# Patient Record
Sex: MALE | Race: BLACK OR AFRICAN AMERICAN | NOT HISPANIC OR LATINO | Employment: FULL TIME | URBAN - METROPOLITAN AREA
[De-identification: names, ages, dates, MRNs, and addresses within clinical notes are randomized per-mention and may not be internally consistent; named-entity substitution may affect disease eponyms.]

---

## 2017-12-22 ENCOUNTER — CONVERSION ENCOUNTER (OUTPATIENT)
Dept: FAMILY MEDICINE CLINIC | Facility: CLINIC | Age: 57
End: 2017-12-22

## 2017-12-22 ENCOUNTER — HOSPITAL ENCOUNTER (OUTPATIENT)
Dept: FAMILY MEDICINE CLINIC | Facility: CLINIC | Age: 57
Setting detail: SPECIMEN
Discharge: HOME | End: 2017-12-22
Attending: NURSE PRACTITIONER | Admitting: NURSE PRACTITIONER

## 2017-12-22 LAB
ALBUMIN SERPL-MCNC: 4.4 G/DL (ref 3.5–4.8)
ALBUMIN/GLOB SERPL: 1.2 {RATIO} (ref 1–1.7)
ALP SERPL-CCNC: 93 IU/L (ref 32–91)
ALT SERPL-CCNC: 16 IU/L (ref 17–63)
ANION GAP SERPL CALC-SCNC: 16.5 MMOL/L (ref 10–20)
AST SERPL-CCNC: 20 IU/L (ref 15–41)
BILIRUB SERPL-MCNC: 0.3 MG/DL (ref 0.3–1.2)
BILIRUB UR QL STRIP: NEGATIVE MG/DL
BUN SERPL-MCNC: 6 MG/DL (ref 8–20)
BUN/CREAT SERPL: 7.5 (ref 6.2–20.3)
CALCIUM SERPL-MCNC: 10.2 MG/DL (ref 8.9–10.3)
CASTS URNS QL MICRO: NORMAL /[LPF]
CHLORIDE SERPL-SCNC: 103 MMOL/L (ref 101–111)
CHOLEST SERPL-MCNC: 268 MG/DL
CHOLEST/HDLC SERPL: 3 {RATIO}
COLOR UR: YELLOW
CONV BACTERIA IN URINE MICRO: NEGATIVE
CONV CLARITY OF URINE: CLEAR
CONV CO2: 28 MMOL/L (ref 22–32)
CONV DACROCYTES (PRESENCE) IN BLOOD BY LIGHT MICROSCOPY: (no result)
CONV HYALINE CASTS IN URINE MICRO: 2 /[LPF] (ref 0–5)
CONV LDL CHOLESTEROL DIRECT: 150 MG/DL (ref 0–100)
CONV PLATELETS GIANT IN BLOOD BY LIGHT MICROSCOPY: (no result)
CONV POIKILOCYTOSIS IN BLOOD BY LIGHT MICROSCOPY: SLIGHT
CONV PROTEIN IN URINE BY AUTOMATED TEST STRIP: NEGATIVE MG/DL
CONV SMALL ROUND CELLS: NORMAL /[HPF]
CONV TOTAL PROTEIN: 8 G/DL (ref 6.1–7.9)
CONV UROBILINOGEN IN URINE BY AUTOMATED TEST STRIP: 0.2 MG/DL
CREAT UR-MCNC: 0.8 MG/DL (ref 0.7–1.2)
CULTURE INDICATED?: NORMAL
DIFFERENTIAL METHOD BLD: (no result)
EOSINOPHIL # BLD AUTO: 0.1 10*3/UL (ref 0–0.3)
EOSINOPHIL # BLD AUTO: 1 % (ref 0–3)
ERYTHROCYTE [DISTWIDTH] IN BLOOD BY AUTOMATED COUNT: 12.8 % (ref 11.5–14.5)
GLOBULIN UR ELPH-MCNC: 3.6 G/DL (ref 2.5–3.8)
GLUCOSE SERPL-MCNC: 96 MG/DL (ref 65–99)
GLUCOSE UR QL: NEGATIVE MG/DL
HCT VFR BLD AUTO: 47.3 % (ref 40–54)
HDLC SERPL-MCNC: 91 MG/DL
HGB BLD-MCNC: 15.7 G/DL (ref 14–18)
HGB UR QL STRIP: NEGATIVE
KETONES UR QL STRIP: NEGATIVE MG/DL
LDLC/HDLC SERPL: 1.7 {RATIO}
LEUKOCYTE ESTERASE UR QL STRIP: NEGATIVE
LIPID INTERPRETATION: ABNORMAL
LYMPHOCYTES # BLD AUTO: 2.3 10*3/UL (ref 0.8–4.8)
LYMPHOCYTES NFR BLD AUTO: 46 % (ref 18–42)
MCH RBC QN AUTO: 30.6 PG (ref 26–32)
MCHC RBC AUTO-ENTMCNC: 33.2 G/DL (ref 32–36)
MCV RBC AUTO: 92.1 FL (ref 80–94)
MONOCYTES # BLD AUTO: 0.2 10*3/UL (ref 0.1–1.3)
MONOCYTES NFR BLD AUTO: 5 % (ref 2–11)
NEUTROPHILS # BLD AUTO: 2.3 10*3/UL (ref 2.3–8.6)
NEUTROPHILS NFR BLD AUTO: 48 % (ref 50–75)
NITRITE UR QL STRIP: NEGATIVE
PH UR STRIP.AUTO: 6 [PH] (ref 4.5–8)
PLATELET # BLD AUTO: 154 10*3/UL (ref 150–450)
PMV BLD AUTO: 10.2 FL (ref 7.4–10.4)
POTASSIUM SERPL-SCNC: 4.5 MMOL/L (ref 3.6–5.1)
RBC # BLD AUTO: 5.14 10*6/UL (ref 4.6–6)
RBC #/AREA URNS HPF: 0 /[HPF] (ref 0–3)
SODIUM SERPL-SCNC: 143 MMOL/L (ref 136–144)
SP GR UR: 1.02 (ref 1–1.03)
SPERM URNS QL MICRO: NORMAL /[HPF]
SQUAMOUS SPT QL MICRO: 0 /[HPF] (ref 0–5)
TRIGL SERPL-MCNC: 164 MG/DL
UNIDENT CRYS URNS QL MICRO: NORMAL /[HPF]
VLDLC SERPL CALC-MCNC: 27.3 MG/DL
WBC # BLD AUTO: 4.9 10*3/UL (ref 4.5–11.5)
WBC #/AREA URNS HPF: 0 /[HPF] (ref 0–5)
YEAST SPEC QL WET PREP: NORMAL /[HPF]

## 2018-01-26 ENCOUNTER — CONVERSION ENCOUNTER (OUTPATIENT)
Dept: FAMILY MEDICINE CLINIC | Facility: CLINIC | Age: 58
End: 2018-01-26

## 2018-02-16 ENCOUNTER — CONVERSION ENCOUNTER (OUTPATIENT)
Dept: FAMILY MEDICINE CLINIC | Facility: CLINIC | Age: 58
End: 2018-02-16

## 2018-08-17 ENCOUNTER — CONVERSION ENCOUNTER (OUTPATIENT)
Dept: FAMILY MEDICINE CLINIC | Facility: CLINIC | Age: 58
End: 2018-08-17

## 2019-02-15 ENCOUNTER — CONVERSION ENCOUNTER (OUTPATIENT)
Dept: FAMILY MEDICINE CLINIC | Facility: CLINIC | Age: 59
End: 2019-02-15

## 2019-02-15 ENCOUNTER — HOSPITAL ENCOUNTER (OUTPATIENT)
Dept: FAMILY MEDICINE CLINIC | Facility: CLINIC | Age: 59
Setting detail: SPECIMEN
Discharge: HOME | End: 2019-02-15
Attending: NURSE PRACTITIONER | Admitting: NURSE PRACTITIONER

## 2019-02-15 LAB
ALBUMIN SERPL-MCNC: 4.7 G/DL (ref 3.5–4.8)
ALBUMIN/GLOB SERPL: 1.3 {RATIO} (ref 1–1.7)
ALP SERPL-CCNC: 134 IU/L (ref 32–91)
ALT SERPL-CCNC: 18 IU/L (ref 17–63)
ANION GAP SERPL CALC-SCNC: 12.6 MMOL/L (ref 10–20)
AST SERPL-CCNC: 21 IU/L (ref 15–41)
BILIRUB SERPL-MCNC: 0.8 MG/DL (ref 0.3–1.2)
BUN SERPL-MCNC: 11 MG/DL (ref 8–20)
BUN/CREAT SERPL: 18.3 (ref 6.2–20.3)
CALCIUM SERPL-MCNC: 9.7 MG/DL (ref 8.9–10.3)
CHLORIDE SERPL-SCNC: 105 MMOL/L (ref 101–111)
CHOLEST SERPL-MCNC: 262 MG/DL
CHOLEST/HDLC SERPL: 2.9 {RATIO}
CONV CO2: 24 MMOL/L (ref 22–32)
CONV LDL CHOLESTEROL DIRECT: 157 MG/DL (ref 0–100)
CONV TOTAL PROTEIN: 8.3 G/DL (ref 6.1–7.9)
CREAT UR-MCNC: 0.6 MG/DL (ref 0.7–1.2)
GLOBULIN UR ELPH-MCNC: 3.6 G/DL (ref 2.5–3.8)
GLUCOSE SERPL-MCNC: 117 MG/DL (ref 65–99)
HDLC SERPL-MCNC: 91 MG/DL
LDLC/HDLC SERPL: 1.7 {RATIO}
LIPID INTERPRETATION: ABNORMAL
MAGNESIUM SERPL-MCNC: 2 MG/DL (ref 1.8–2.5)
POTASSIUM SERPL-SCNC: 3.6 MMOL/L (ref 3.6–5.1)
SODIUM SERPL-SCNC: 138 MMOL/L (ref 136–144)
TRIGL SERPL-MCNC: 52 MG/DL
VLDLC SERPL CALC-MCNC: 14 MG/DL

## 2019-06-04 VITALS
HEIGHT: 74 IN | SYSTOLIC BLOOD PRESSURE: 136 MMHG | OXYGEN SATURATION: 98 % | HEIGHT: 74 IN | RESPIRATION RATE: 20 BRPM | RESPIRATION RATE: 20 BRPM | HEIGHT: 74 IN | WEIGHT: 179 LBS | SYSTOLIC BLOOD PRESSURE: 169 MMHG | OXYGEN SATURATION: 98 % | BODY MASS INDEX: 22.41 KG/M2 | BODY MASS INDEX: 22.7 KG/M2 | RESPIRATION RATE: 16 BRPM | SYSTOLIC BLOOD PRESSURE: 158 MMHG | WEIGHT: 176.9 LBS | WEIGHT: 176.7 LBS | DIASTOLIC BLOOD PRESSURE: 68 MMHG | OXYGEN SATURATION: 99 % | OXYGEN SATURATION: 98 % | HEART RATE: 66 BPM | WEIGHT: 179.4 LBS | BODY MASS INDEX: 22.97 KG/M2 | SYSTOLIC BLOOD PRESSURE: 124 MMHG | BODY MASS INDEX: 23.02 KG/M2 | BODY MASS INDEX: 22.68 KG/M2 | WEIGHT: 174.6 LBS | DIASTOLIC BLOOD PRESSURE: 107 MMHG | HEART RATE: 67 BPM | DIASTOLIC BLOOD PRESSURE: 79 MMHG | RESPIRATION RATE: 16 BRPM | HEART RATE: 85 BPM | DIASTOLIC BLOOD PRESSURE: 76 MMHG | HEART RATE: 74 BPM | DIASTOLIC BLOOD PRESSURE: 91 MMHG | SYSTOLIC BLOOD PRESSURE: 126 MMHG | HEIGHT: 74 IN | OXYGEN SATURATION: 96 % | HEART RATE: 63 BPM | HEIGHT: 74 IN | RESPIRATION RATE: 24 BRPM

## 2019-08-16 ENCOUNTER — OFFICE VISIT (OUTPATIENT)
Dept: FAMILY MEDICINE CLINIC | Facility: CLINIC | Age: 59
End: 2019-08-16

## 2019-08-16 VITALS
OXYGEN SATURATION: 98 % | TEMPERATURE: 98.3 F | BODY MASS INDEX: 22.37 KG/M2 | DIASTOLIC BLOOD PRESSURE: 81 MMHG | HEIGHT: 74 IN | WEIGHT: 174.3 LBS | SYSTOLIC BLOOD PRESSURE: 137 MMHG | RESPIRATION RATE: 16 BRPM | HEART RATE: 55 BPM

## 2019-08-16 DIAGNOSIS — E78.5 HYPERLIPIDEMIA, UNSPECIFIED HYPERLIPIDEMIA TYPE: Primary | ICD-10-CM

## 2019-08-16 DIAGNOSIS — F10.10 ALCOHOL ABUSE: ICD-10-CM

## 2019-08-16 DIAGNOSIS — I10 HYPERTENSION, UNSPECIFIED TYPE: ICD-10-CM

## 2019-08-16 PROBLEM — E88.09 HYPERPROTEINEMIA: Status: ACTIVE | Noted: 2019-02-15

## 2019-08-16 PROBLEM — Z81.1 FAMILY HISTORY OF ALCOHOLISM: Status: ACTIVE | Noted: 2018-02-16

## 2019-08-16 PROCEDURE — 99213 OFFICE O/P EST LOW 20 MIN: CPT | Performed by: NURSE PRACTITIONER

## 2019-08-16 RX ORDER — AMLODIPINE BESYLATE 10 MG/1
TABLET ORAL DAILY
Refills: 0 | COMMUNITY
Start: 2019-06-13 | End: 2019-09-18 | Stop reason: SDUPTHER

## 2019-08-16 NOTE — PROGRESS NOTES
"Subjective   Enrrique Cason is a 58 y.o. male.     Chief Complaint   Patient presents with   • Hypertension     6 month follow up    • Hyperlipidemia   • Alcohol Intoxication   • Hyperproteinemia       HPI  Patient is here for follow up on his chronic medical problems and medication refills.  Hypertension: he is taking amlodipine 10 mg.he never checks his blood pressure.  Alcohol abuse ; still 6 beers day and 1/2 of 1/2 pint. Going to be honest he reports.  Hyperlipidemia: he declines medications says he likes meat and ain't going to stop him from eating this.      The following portions of the patient's history were reviewed and updated as appropriate: allergies, current medications, past family history, past medical history, past social history, past surgical history and problem list.      Current Outpatient Medications:   •  amLODIPine (NORVASC) 10 MG tablet, Take  by mouth Daily., Disp: , Rfl: 0    No results found for this or any previous visit (from the past 4032 hour(s)).      Review of Systems   Constitutional: Negative for chills and fever.   HENT: Negative for congestion, sinus pressure and swollen glands.    Eyes: Negative for blurred vision and pain.   Respiratory: Negative for cough and shortness of breath.    Cardiovascular: Negative for chest pain and leg swelling.   Gastrointestinal: Negative for abdominal pain, nausea and indigestion.   Endocrine: Negative for cold intolerance, heat intolerance, polydipsia, polyphagia and polyuria.   Skin: Negative for dry skin, rash and bruise.   Neurological: Negative for seizures, light-headedness, headache and memory problem.   Psychiatric/Behavioral: Negative for dysphoric mood and stress.       Objective     /81 (BP Location: Left arm, Patient Position: Sitting, Cuff Size: Adult)   Pulse 55   Temp 98.3 °F (36.8 °C) (Oral)   Resp 16   Ht 188 cm (74\")   Wt 79.1 kg (174 lb 4.8 oz)   SpO2 98%   BMI 22.38 kg/m²     Physical Exam   Constitutional: He is " oriented to person, place, and time. He appears well-developed and well-nourished.   HENT:   Head: Normocephalic and atraumatic.   Eyes: Conjunctivae and EOM are normal. Pupils are equal, round, and reactive to light.   Neck: Normal range of motion. Neck supple.   Cardiovascular: Normal rate, regular rhythm, normal heart sounds and intact distal pulses.   Pulmonary/Chest: Effort normal and breath sounds normal.   Abdominal: Soft. Bowel sounds are normal.   Musculoskeletal: Normal range of motion.   Neurological: He is alert and oriented to person, place, and time.   Skin: Skin is warm and dry.   Psychiatric: He has a normal mood and affect. His behavior is normal.   Nursing note and vitals reviewed.        Assessment/Plan   Enrrique was seen today for hypertension, hyperlipidemia, alcohol intoxication and hyperproteinemia.    Diagnoses and all orders for this visit:    Hyperlipidemia, unspecified hyperlipidemia type    Hypertension, unspecified type    Alcohol abuse      Patient Instructions   You should stop drinking.  Try  To eat less meat and more vegetables.  Keep taking medication check blood pressure occassionaly.         Joanna Hair, JANINA    08/16/19

## 2019-08-16 NOTE — PATIENT INSTRUCTIONS
You should stop drinking.  Try  To eat less meat and more vegetables.  Keep taking medication check blood pressure occassionaly.

## 2019-09-18 RX ORDER — AMLODIPINE BESYLATE 10 MG/1
TABLET ORAL
Qty: 90 TABLET | Refills: 0 | Status: SHIPPED | OUTPATIENT
Start: 2019-09-18 | End: 2019-12-22

## 2019-12-22 RX ORDER — AMLODIPINE BESYLATE 10 MG/1
TABLET ORAL
Qty: 90 TABLET | Refills: 0 | Status: SHIPPED | OUTPATIENT
Start: 2019-12-22 | End: 2020-02-21 | Stop reason: SDUPTHER

## 2020-02-21 ENCOUNTER — APPOINTMENT (OUTPATIENT)
Dept: LAB | Facility: HOSPITAL | Age: 60
End: 2020-02-21

## 2020-02-21 ENCOUNTER — OFFICE VISIT (OUTPATIENT)
Dept: FAMILY MEDICINE CLINIC | Facility: CLINIC | Age: 60
End: 2020-02-21

## 2020-02-21 VITALS
HEIGHT: 74 IN | HEART RATE: 66 BPM | TEMPERATURE: 98.2 F | WEIGHT: 181.7 LBS | OXYGEN SATURATION: 98 % | BODY MASS INDEX: 23.32 KG/M2 | DIASTOLIC BLOOD PRESSURE: 94 MMHG | SYSTOLIC BLOOD PRESSURE: 165 MMHG | RESPIRATION RATE: 18 BRPM

## 2020-02-21 DIAGNOSIS — E88.09 HYPERPROTEINEMIA: ICD-10-CM

## 2020-02-21 DIAGNOSIS — E78.5 HYPERLIPIDEMIA, UNSPECIFIED HYPERLIPIDEMIA TYPE: Primary | ICD-10-CM

## 2020-02-21 DIAGNOSIS — F10.10 ALCOHOL ABUSE: ICD-10-CM

## 2020-02-21 DIAGNOSIS — I10 HYPERTENSION, UNSPECIFIED TYPE: ICD-10-CM

## 2020-02-21 LAB
ALBUMIN SERPL-MCNC: 5 G/DL (ref 3.5–5.2)
ALBUMIN/GLOB SERPL: 1.5 G/DL
ALP SERPL-CCNC: 121 U/L (ref 39–117)
ALT SERPL W P-5'-P-CCNC: 14 U/L (ref 1–41)
ANION GAP SERPL CALCULATED.3IONS-SCNC: 14.4 MMOL/L (ref 5–15)
AST SERPL-CCNC: 17 U/L (ref 1–40)
BASOPHILS # BLD AUTO: 0.02 10*3/MM3 (ref 0–0.2)
BASOPHILS NFR BLD AUTO: 0.4 % (ref 0–1.5)
BILIRUB SERPL-MCNC: 0.5 MG/DL (ref 0.2–1.2)
BUN BLD-MCNC: 13 MG/DL (ref 6–20)
BUN/CREAT SERPL: 16.3 (ref 7–25)
CALCIUM SPEC-SCNC: 10.2 MG/DL (ref 8.6–10.5)
CHLORIDE SERPL-SCNC: 100 MMOL/L (ref 98–107)
CHOLEST SERPL-MCNC: 249 MG/DL (ref 0–200)
CO2 SERPL-SCNC: 25.6 MMOL/L (ref 22–29)
CREAT BLD-MCNC: 0.8 MG/DL (ref 0.76–1.27)
DEPRECATED RDW RBC AUTO: 38.5 FL (ref 37–54)
EOSINOPHIL # BLD AUTO: 0.05 10*3/MM3 (ref 0–0.4)
EOSINOPHIL NFR BLD AUTO: 1 % (ref 0.3–6.2)
ERYTHROCYTE [DISTWIDTH] IN BLOOD BY AUTOMATED COUNT: 12 % (ref 12.3–15.4)
GFR SERPL CREATININE-BSD FRML MDRD: 120 ML/MIN/1.73
GLOBULIN UR ELPH-MCNC: 3.3 GM/DL
GLUCOSE BLD-MCNC: 145 MG/DL (ref 65–99)
HCT VFR BLD AUTO: 46.6 % (ref 37.5–51)
HDLC SERPL-MCNC: 104 MG/DL (ref 40–60)
HGB BLD-MCNC: 16.1 G/DL (ref 13–17.7)
IMM GRANULOCYTES # BLD AUTO: 0.01 10*3/MM3 (ref 0–0.05)
IMM GRANULOCYTES NFR BLD AUTO: 0.2 % (ref 0–0.5)
LDLC SERPL CALC-MCNC: 133 MG/DL (ref 0–100)
LDLC/HDLC SERPL: 1.28 {RATIO}
LYMPHOCYTES # BLD AUTO: 2.23 10*3/MM3 (ref 0.7–3.1)
LYMPHOCYTES NFR BLD AUTO: 46.5 % (ref 19.6–45.3)
MCH RBC QN AUTO: 30.3 PG (ref 26.6–33)
MCHC RBC AUTO-ENTMCNC: 34.5 G/DL (ref 31.5–35.7)
MCV RBC AUTO: 87.6 FL (ref 79–97)
MONOCYTES # BLD AUTO: 0.43 10*3/MM3 (ref 0.1–0.9)
MONOCYTES NFR BLD AUTO: 9 % (ref 5–12)
NEUTROPHILS # BLD AUTO: 2.06 10*3/MM3 (ref 1.7–7)
NEUTROPHILS NFR BLD AUTO: 42.9 % (ref 42.7–76)
NRBC BLD AUTO-RTO: 0 /100 WBC (ref 0–0.2)
PLATELET # BLD AUTO: 182 10*3/MM3 (ref 140–450)
PMV BLD AUTO: 12.1 FL (ref 6–12)
POTASSIUM BLD-SCNC: 4.4 MMOL/L (ref 3.5–5.2)
PROT SERPL-MCNC: 8.3 G/DL (ref 6–8.5)
RBC # BLD AUTO: 5.32 10*6/MM3 (ref 4.14–5.8)
SODIUM BLD-SCNC: 140 MMOL/L (ref 136–145)
TRIGL SERPL-MCNC: 59 MG/DL (ref 0–150)
VLDLC SERPL-MCNC: 11.8 MG/DL (ref 5–40)
WBC NRBC COR # BLD: 4.8 10*3/MM3 (ref 3.4–10.8)

## 2020-02-21 PROCEDURE — 80053 COMPREHEN METABOLIC PANEL: CPT | Performed by: NURSE PRACTITIONER

## 2020-02-21 PROCEDURE — 36415 COLL VENOUS BLD VENIPUNCTURE: CPT | Performed by: NURSE PRACTITIONER

## 2020-02-21 PROCEDURE — 80061 LIPID PANEL: CPT | Performed by: NURSE PRACTITIONER

## 2020-02-21 PROCEDURE — 85025 COMPLETE CBC W/AUTO DIFF WBC: CPT | Performed by: NURSE PRACTITIONER

## 2020-02-21 PROCEDURE — 84165 PROTEIN E-PHORESIS SERUM: CPT | Performed by: NURSE PRACTITIONER

## 2020-02-21 PROCEDURE — 99213 OFFICE O/P EST LOW 20 MIN: CPT | Performed by: NURSE PRACTITIONER

## 2020-02-21 RX ORDER — AMLODIPINE BESYLATE 10 MG/1
10 TABLET ORAL DAILY
Qty: 90 TABLET | Refills: 1 | Status: SHIPPED | OUTPATIENT
Start: 2020-02-21 | End: 2020-02-21 | Stop reason: SDUPTHER

## 2020-02-21 RX ORDER — LOSARTAN POTASSIUM 25 MG/1
25 TABLET ORAL DAILY
Qty: 90 TABLET | Refills: 0 | Status: SHIPPED | OUTPATIENT
Start: 2020-02-21 | End: 2020-05-18

## 2020-02-21 RX ORDER — AMLODIPINE BESYLATE 10 MG/1
10 TABLET ORAL DAILY
Qty: 90 TABLET | Refills: 1 | Status: SHIPPED | OUTPATIENT
Start: 2020-02-21 | End: 2020-08-13

## 2020-02-21 NOTE — PATIENT INSTRUCTIONS
Continue amlodipine start the losartan daily.   AA meeting may help with alchohol abuse.   Follow up one month recheck blood pressure  Follow up on labs.

## 2020-02-21 NOTE — PROGRESS NOTES
"Subjective   Enrrique Cason is a 59 y.o. male.     Chief Complaint   Patient presents with   • Hyperlipidemia     Alcohol abuse , arthritis - 6 month follow up    • Hypertension       HPI  He is here for management of his chronic medical problems. Hypertension and alcohol abuse  hyperproteinemia    Hypertension: elevated in office. 165/94. Taking amlodipine 10 mg once day. He does drink daily.  Drinks beer and shot of vodka. He has had stress of recent.     Alcohol abuse: he works daily he has hypertension. He drinks beer and shot of vodka.     Hyperproteinemia: has been consistenly high will check levels  Serum electrophoresis. He denies night time fevers, no unexpained weight loss.     The following portions of the patient's history were reviewed and updated as appropriate: allergies, current medications, past family history, past medical history, past social history, past surgical history and problem list.      Current Outpatient Medications:   •  amLODIPine (NORVASC) 10 MG tablet, Take 1 tablet by mouth Daily., Disp: 90 tablet, Rfl: 1  •  losartan (COZAAR) 25 MG tablet, Take 1 tablet by mouth Daily., Disp: 90 tablet, Rfl: 0    No results found for this or any previous visit (from the past 4032 hour(s)).      Review of Systems   Cardiovascular: Negative for chest pain and leg swelling.   Genitourinary: Negative for dysuria and frequency.   Musculoskeletal: Negative for back pain.   Allergic/Immunologic: Negative.    Neurological: Negative for dizziness.   Psychiatric/Behavioral: Positive for stress.       Objective     /94 (BP Location: Left arm, Patient Position: Sitting, Cuff Size: Adult)   Pulse 66   Temp 98.2 °F (36.8 °C) (Oral)   Resp 18   Ht 188 cm (74\")   Wt 82.4 kg (181 lb 11.2 oz)   SpO2 98%   BMI 23.33 kg/m²     Physical Exam   Constitutional: He is oriented to person, place, and time. He appears well-developed and well-nourished.   HENT:   Head: Normocephalic and atraumatic.   Right Ear: " External ear normal.   Left Ear: External ear normal.   Nose: Nose normal.   Mouth/Throat: Oropharynx is clear and moist. No oropharyngeal exudate.   Eyes: Pupils are equal, round, and reactive to light. Conjunctivae are normal.   Neck: Normal range of motion.   Cardiovascular: Normal rate, regular rhythm, normal heart sounds and intact distal pulses.   Pulmonary/Chest: Effort normal and breath sounds normal.   Abdominal: Soft. Bowel sounds are normal.   Musculoskeletal: Normal range of motion.   Neurological: He is alert and oriented to person, place, and time.   Skin: Skin is warm and dry.   Psychiatric: He has a normal mood and affect. His behavior is normal. Judgment and thought content normal.   Nursing note and vitals reviewed.        Assessment/Plan   Enrrique was seen today for hyperlipidemia and hypertension.    Diagnoses and all orders for this visit:    Hyperlipidemia, unspecified hyperlipidemia type  -     CBC & Differential  -     Lipid Panel  -     Comprehensive Metabolic Panel    Hypertension, unspecified type  -     CBC & Differential  -     Lipid Panel  -     Comprehensive Metabolic Panel    Alcohol abuse  -     CBC & Differential  -     Lipid Panel  -     Comprehensive Metabolic Panel    Hyperproteinemia  -     Protein Electrophoresis, Total    Other orders  -     amLODIPine (NORVASC) 10 MG tablet; Take 1 tablet by mouth Daily.  -     losartan (COZAAR) 25 MG tablet; Take 1 tablet by mouth Daily.      Patient Instructions   Continue amlodipine start the losartan daily.   AA meeting may help with alchohol abuse.   Follow up one month recheck blood pressure  Follow up on labs.         Joanna Hair, JANINA    02/21/20

## 2020-02-24 LAB
ALBUMIN SERPL-MCNC: 4.6 G/DL (ref 2.9–4.4)
ALBUMIN/GLOB SERPL: 1.3 {RATIO} (ref 0.7–1.7)
ALPHA1 GLOB FLD ELPH-MCNC: 0.3 G/DL (ref 0–0.4)
ALPHA2 GLOB SERPL ELPH-MCNC: 0.6 G/DL (ref 0.4–1)
B-GLOBULIN SERPL ELPH-MCNC: 1.1 G/DL (ref 0.7–1.3)
GAMMA GLOB SERPL ELPH-MCNC: 1.5 G/DL (ref 0.4–1.8)
GLOBULIN SER CALC-MCNC: 3.5 G/DL (ref 2.2–3.9)
Lab: ABNORMAL
M-SPIKE: ABNORMAL G/DL
PROT SERPL-MCNC: 8.1 G/DL (ref 6–8.5)

## 2020-03-20 ENCOUNTER — OFFICE VISIT (OUTPATIENT)
Dept: FAMILY MEDICINE CLINIC | Facility: CLINIC | Age: 60
End: 2020-03-20

## 2020-03-20 VITALS
BODY MASS INDEX: 23.37 KG/M2 | HEIGHT: 74 IN | DIASTOLIC BLOOD PRESSURE: 79 MMHG | TEMPERATURE: 98.5 F | SYSTOLIC BLOOD PRESSURE: 159 MMHG | OXYGEN SATURATION: 98 % | RESPIRATION RATE: 18 BRPM | WEIGHT: 182.1 LBS | HEART RATE: 75 BPM

## 2020-03-20 DIAGNOSIS — E78.5 HYPERLIPIDEMIA, UNSPECIFIED HYPERLIPIDEMIA TYPE: Primary | ICD-10-CM

## 2020-03-20 DIAGNOSIS — Z12.11 ENCOUNTER FOR SCREENING FOR MALIGNANT NEOPLASM OF COLON: ICD-10-CM

## 2020-03-20 DIAGNOSIS — F10.10 ALCOHOL ABUSE: ICD-10-CM

## 2020-03-20 DIAGNOSIS — I10 HYPERTENSION, UNSPECIFIED TYPE: ICD-10-CM

## 2020-03-20 PROCEDURE — 99214 OFFICE O/P EST MOD 30 MIN: CPT | Performed by: NURSE PRACTITIONER

## 2020-03-20 RX ORDER — ATORVASTATIN CALCIUM 20 MG/1
20 TABLET, FILM COATED ORAL DAILY
Qty: 90 TABLET | Refills: 0 | Status: SHIPPED | OUTPATIENT
Start: 2020-03-20 | End: 2020-06-24

## 2020-03-20 NOTE — PROGRESS NOTES
Subjective   Enrrique Cason is a 59 y.o. male.     Chief Complaint   Patient presents with   • Hypertension     One month follow up   • Hyperlipidemia   • Alcohol Intoxication   • hyperproteinemia       HPI  Pt here for management of his chronic medical problems;  Hypertension, hyperlipidemia, alcohol intoxication and hyperproteinemia.    Hypertension: 159/79 good today. He is taking losartan 25 mg daily. Amlodipine 10 mg once day. He denies any chest pain. Not short of air with activity. Feels pretty good.    Hyperproteinemia: his protein electrophoresis was normal.albumin slightly elevated .     Hyperlipidemia: tC 249 high , tri 59 normal,   high. He does drink alcholol has high alkaline phos.  Needs to start the the statin. Here to discuss this.     Colon cancer screening he said was done several years ago. He was told f/u 10 years no polyps found.     The following portions of the patient's history were reviewed and updated as appropriate: allergies, current medications, past family history, past medical history, past social history, past surgical history and problem list.      Current Outpatient Medications:   •  amLODIPine (NORVASC) 10 MG tablet, Take 1 tablet by mouth Daily., Disp: 90 tablet, Rfl: 1  •  losartan (COZAAR) 25 MG tablet, Take 1 tablet by mouth Daily., Disp: 90 tablet, Rfl: 0  •  atorvastatin (LIPITOR) 20 MG tablet, Take 1 tablet by mouth Daily., Disp: 90 tablet, Rfl: 0    Recent Results (from the past 4032 hour(s))   Lipid Panel    Collection Time: 02/21/20  9:16 AM   Result Value Ref Range    Total Cholesterol 249 (H) 0 - 200 mg/dL    Triglycerides 59 0 - 150 mg/dL    HDL Cholesterol 104 (H) 40 - 60 mg/dL    LDL Cholesterol  133 (H) 0 - 100 mg/dL    VLDL Cholesterol 11.8 5 - 40 mg/dL    LDL/HDL Ratio 1.28    Comprehensive Metabolic Panel    Collection Time: 02/21/20  9:16 AM   Result Value Ref Range    Glucose 145 (H) 65 - 99 mg/dL    BUN 13 6 - 20 mg/dL    Creatinine 0.80 0.76 -  1.27 mg/dL    Sodium 140 136 - 145 mmol/L    Potassium 4.4 3.5 - 5.2 mmol/L    Chloride 100 98 - 107 mmol/L    CO2 25.6 22.0 - 29.0 mmol/L    Calcium 10.2 8.6 - 10.5 mg/dL    Total Protein 8.3 6.0 - 8.5 g/dL    Albumin 5.00 3.50 - 5.20 g/dL    ALT (SGPT) 14 1 - 41 U/L    AST (SGOT) 17 1 - 40 U/L    Alkaline Phosphatase 121 (H) 39 - 117 U/L    Total Bilirubin 0.5 0.2 - 1.2 mg/dL    eGFR  African Amer 120 >60 mL/min/1.73    Globulin 3.3 gm/dL    A/G Ratio 1.5 g/dL    BUN/Creatinine Ratio 16.3 7.0 - 25.0    Anion Gap 14.4 5.0 - 15.0 mmol/L   Protein Electrophoresis, Total    Collection Time: 02/21/20  9:16 AM   Result Value Ref Range    Total Protein 8.1 6.0 - 8.5 g/dL    Albumin 4.6 (H) 2.9 - 4.4 g/dL    Alpha-1-Globulin 0.3 0.0 - 0.4 g/dL    Alpha-2-Globulin 0.6 0.4 - 1.0 g/dL    Beta Globulin 1.1 0.7 - 1.3 g/dL    Gamma Globulin 1.5 0.4 - 1.8 g/dL    M-Donis Not Observed Not Observed g/dL    Globulin 3.5 2.2 - 3.9 g/dL    A/G Ratio 1.3 0.7 - 1.7    Please note Comment    CBC Auto Differential    Collection Time: 02/21/20  9:16 AM   Result Value Ref Range    WBC 4.80 3.40 - 10.80 10*3/mm3    RBC 5.32 4.14 - 5.80 10*6/mm3    Hemoglobin 16.1 13.0 - 17.7 g/dL    Hematocrit 46.6 37.5 - 51.0 %    MCV 87.6 79.0 - 97.0 fL    MCH 30.3 26.6 - 33.0 pg    MCHC 34.5 31.5 - 35.7 g/dL    RDW 12.0 (L) 12.3 - 15.4 %    RDW-SD 38.5 37.0 - 54.0 fl    MPV 12.1 (H) 6.0 - 12.0 fL    Platelets 182 140 - 450 10*3/mm3    Neutrophil % 42.9 42.7 - 76.0 %    Lymphocyte % 46.5 (H) 19.6 - 45.3 %    Monocyte % 9.0 5.0 - 12.0 %    Eosinophil % 1.0 0.3 - 6.2 %    Basophil % 0.4 0.0 - 1.5 %    Immature Grans % 0.2 0.0 - 0.5 %    Neutrophils, Absolute 2.06 1.70 - 7.00 10*3/mm3    Lymphocytes, Absolute 2.23 0.70 - 3.10 10*3/mm3    Monocytes, Absolute 0.43 0.10 - 0.90 10*3/mm3    Eosinophils, Absolute 0.05 0.00 - 0.40 10*3/mm3    Basophils, Absolute 0.02 0.00 - 0.20 10*3/mm3    Immature Grans, Absolute 0.01 0.00 - 0.05 10*3/mm3    nRBC 0.0 0.0 - 0.2  "/100 WBC         Review of Systems   Constitutional: Negative for chills and fever.   HENT: Negative for congestion, sinus pressure and swollen glands.    Eyes: Negative for blurred vision and pain.   Respiratory: Negative for cough and shortness of breath.    Cardiovascular: Negative for chest pain and leg swelling.   Gastrointestinal: Negative for abdominal pain, nausea and indigestion.   Endocrine: Negative for cold intolerance, heat intolerance, polydipsia, polyphagia and polyuria.   Genitourinary: Negative for dysuria, flank pain, frequency and nocturia.   Skin: Negative for dry skin, rash and bruise.   Neurological: Negative for light-headedness, headache and memory problem.   Psychiatric/Behavioral: Negative for dysphoric mood and stress.       Objective     /79 (BP Location: Right arm, Patient Position: Sitting, Cuff Size: Adult)   Pulse 75   Temp 98.5 °F (36.9 °C) (Oral)   Resp 18   Ht 188 cm (74\")   Wt 82.6 kg (182 lb 1.6 oz)   SpO2 98%   BMI 23.38 kg/m²     Physical Exam   Constitutional: He is oriented to person, place, and time. He appears well-developed and well-nourished.   HENT:   Head: Normocephalic and atraumatic.   Right Ear: External ear normal.   Left Ear: External ear normal.   Nose: Nose normal.   Mouth/Throat: Oropharynx is clear and moist. No oropharyngeal exudate.   Eyes: Pupils are equal, round, and reactive to light. Conjunctivae and EOM are normal.   Neck: Normal range of motion. Neck supple.   Cardiovascular: Normal rate, regular rhythm, normal heart sounds and intact distal pulses.   Pulmonary/Chest: Effort normal and breath sounds normal.   Abdominal: Soft. Bowel sounds are normal.   Musculoskeletal: Normal range of motion.   Neurological: He is alert and oriented to person, place, and time.   Skin: Skin is warm and dry.   Psychiatric: He has a normal mood and affect. His behavior is normal. Thought content normal.   Nursing note and vitals " reviewed.        Assessment/Plan   Enrrique was seen today for hypertension, hyperlipidemia, alcohol intoxication and hyperproteinemia.    Diagnoses and all orders for this visit:    Hyperlipidemia, unspecified hyperlipidemia type    Hypertension, unspecified type    Alcohol abuse    Encounter for screening for malignant neoplasm of colon  Comments:  He reports he had this done few years ago by Dr Mckenna: he said no polyps. will find records.     Other orders  -     atorvastatin (LIPITOR) 20 MG tablet; Take 1 tablet by mouth Daily.      Patient Instructions   Follow up 3 months  Take statin at night.  Stop alcholol  Eat healthy       Joanna Hair, APRN    03/20/20

## 2020-03-20 NOTE — PATIENT INSTRUCTIONS
"Follow up 3 months  Take statin at night.  Stop alcholol  Eat healthy   High Cholesterol    High cholesterol is a condition in which the blood has high levels of a white, waxy, fat-like substance (cholesterol). The human body needs small amounts of cholesterol. The liver makes all the cholesterol that the body needs. Extra (excess) cholesterol comes from the food that we eat.  Cholesterol is carried from the liver by the blood through the blood vessels. If you have high cholesterol, deposits (plaques) may build up on the walls of your blood vessels (arteries). Plaques make the arteries narrower and stiffer. Cholesterol plaques increase your risk for heart attack and stroke. Work with your health care provider to keep your cholesterol levels in a healthy range.  What increases the risk?  This condition is more likely to develop in people who:  · Eat foods that are high in animal fat (saturated fat) or cholesterol.  · Are overweight.  · Are not getting enough exercise.  · Have a family history of high cholesterol.  What are the signs or symptoms?  There are no symptoms of this condition.  How is this diagnosed?  This condition may be diagnosed from the results of a blood test.  · If you are older than age 20, your health care provider may check your cholesterol every 4-6 years.  · You may be checked more often if you already have high cholesterol or other risk factors for heart disease.  The blood test for cholesterol measures:  · \"Bad\" cholesterol (LDL cholesterol). This is the main type of cholesterol that causes heart disease. The desired level for LDL is less than 100.  · \"Good\" cholesterol (HDL cholesterol). This type helps to protect against heart disease by cleaning the arteries and carrying the LDL away. The desired level for HDL is 60 or higher.  · Triglycerides. These are fats that the body can store or burn for energy. The desired number for triglycerides is lower than 150.  · Total cholesterol. This is a " measure of the total amount of cholesterol in your blood, including LDL cholesterol, HDL cholesterol, and triglycerides. A healthy number is less than 200.  How is this treated?  This condition is treated with diet changes, lifestyle changes, and medicines.  Diet changes  · This may include eating more whole grains, fruits, vegetables, nuts, and fish.  · This may also include cutting back on red meat and foods that have a lot of added sugar.  Lifestyle changes  · Changes may include getting at least 40 minutes of aerobic exercise 3 times a week. Aerobic exercises include walking, biking, and swimming. Aerobic exercise along with a healthy diet can help you maintain a healthy weight.  · Changes may also include quitting smoking.  Medicines  · Medicines are usually given if diet and lifestyle changes have failed to reduce your cholesterol to healthy levels.  · Your health care provider may prescribe a statin medicine. Statin medicines have been shown to reduce cholesterol, which can reduce the risk of heart disease.  Follow these instructions at home:  Eating and drinking  If told by your health care provider:  · Eat chicken (without skin), fish, veal, shellfish, ground turkey breast, and round or loin cuts of red meat.  · Do not eat fried foods or fatty meats, such as hot dogs and salami.  · Eat plenty of fruits, such as apples.  · Eat plenty of vegetables, such as broccoli, potatoes, and carrots.  · Eat beans, peas, and lentils.  · Eat grains such as barley, rice, couscous, and bulgur wheat.  · Eat pasta without cream sauces.  · Use skim or nonfat milk, and eat low-fat or nonfat yogurt and cheeses.  · Do not eat or drink whole milk, cream, ice cream, egg yolks, or hard cheeses.  · Do not eat stick margarine or tub margarines that contain trans fats (also called partially hydrogenated oils).  · Do not eat saturated tropical oils, such as coconut oil and palm oil.  · Do not eat cakes, cookies, crackers, or other baked  goods that contain trans fats.    General instructions  · Exercise as directed by your health care provider. Increase your activity level with activities such as gardening, walking, and taking the stairs.  · Take over-the-counter and prescription medicines only as told by your health care provider.  · Do not use any products that contain nicotine or tobacco, such as cigarettes and e-cigarettes. If you need help quitting, ask your health care provider.  · Keep all follow-up visits as told by your health care provider. This is important.  Contact a health care provider if:  · You are struggling to maintain a healthy diet or weight.  · You need help to start on an exercise program.  · You need help to stop smoking.  Get help right away if:  · You have chest pain.  · You have trouble breathing.  This information is not intended to replace advice given to you by your health care provider. Make sure you discuss any questions you have with your health care provider.  Document Released: 12/18/2006 Document Revised: 12/20/2019 Document Reviewed: 06/17/2017  [employer] Patient Education © 2020 [employer]

## 2020-05-18 RX ORDER — LOSARTAN POTASSIUM 25 MG/1
25 TABLET ORAL DAILY
Qty: 90 TABLET | Refills: 0 | Status: SHIPPED | OUTPATIENT
Start: 2020-05-18 | End: 2020-08-13

## 2020-06-24 RX ORDER — ATORVASTATIN CALCIUM 20 MG/1
20 TABLET, FILM COATED ORAL DAILY
Qty: 90 TABLET | Refills: 0 | Status: SHIPPED | OUTPATIENT
Start: 2020-06-24 | End: 2020-09-21

## 2020-07-10 ENCOUNTER — LAB (OUTPATIENT)
Dept: LAB | Facility: HOSPITAL | Age: 60
End: 2020-07-10

## 2020-07-10 ENCOUNTER — OFFICE VISIT (OUTPATIENT)
Dept: FAMILY MEDICINE CLINIC | Facility: CLINIC | Age: 60
End: 2020-07-10

## 2020-07-10 VITALS
OXYGEN SATURATION: 98 % | SYSTOLIC BLOOD PRESSURE: 129 MMHG | HEART RATE: 64 BPM | RESPIRATION RATE: 20 BRPM | DIASTOLIC BLOOD PRESSURE: 74 MMHG | WEIGHT: 176.6 LBS | BODY MASS INDEX: 22.66 KG/M2 | HEIGHT: 74 IN

## 2020-07-10 DIAGNOSIS — Z13.9 ENCOUNTER FOR HEALTH-RELATED SCREENING: ICD-10-CM

## 2020-07-10 DIAGNOSIS — Z12.5 PROSTATE CANCER SCREENING: ICD-10-CM

## 2020-07-10 DIAGNOSIS — E78.5 HYPERLIPIDEMIA, UNSPECIFIED HYPERLIPIDEMIA TYPE: Primary | ICD-10-CM

## 2020-07-10 DIAGNOSIS — F10.10 ALCOHOL ABUSE: ICD-10-CM

## 2020-07-10 DIAGNOSIS — E78.5 HYPERLIPIDEMIA, UNSPECIFIED HYPERLIPIDEMIA TYPE: ICD-10-CM

## 2020-07-10 DIAGNOSIS — I10 HYPERTENSION, UNSPECIFIED TYPE: ICD-10-CM

## 2020-07-10 LAB
ALBUMIN SERPL-MCNC: 4.6 G/DL (ref 3.5–5.2)
ALBUMIN/GLOB SERPL: 1.4 G/DL
ALP SERPL-CCNC: 166 U/L (ref 39–117)
ALT SERPL W P-5'-P-CCNC: 17 U/L (ref 1–41)
ANION GAP SERPL CALCULATED.3IONS-SCNC: 10.9 MMOL/L (ref 5–15)
AST SERPL-CCNC: 17 U/L (ref 1–40)
BILIRUB SERPL-MCNC: 0.2 MG/DL (ref 0–1.2)
BUN SERPL-MCNC: 15 MG/DL (ref 6–20)
BUN/CREAT SERPL: 20.3 (ref 7–25)
CALCIUM SPEC-SCNC: 9.8 MG/DL (ref 8.6–10.5)
CHLORIDE SERPL-SCNC: 104 MMOL/L (ref 98–107)
CHOLEST SERPL-MCNC: 197 MG/DL (ref 0–200)
CO2 SERPL-SCNC: 25.1 MMOL/L (ref 22–29)
CREAT SERPL-MCNC: 0.74 MG/DL (ref 0.76–1.27)
GFR SERPL CREATININE-BSD FRML MDRD: 131 ML/MIN/1.73
GLOBULIN UR ELPH-MCNC: 3.4 GM/DL
GLUCOSE SERPL-MCNC: 124 MG/DL (ref 65–99)
HCV AB SER DONR QL: NORMAL
HDLC SERPL-MCNC: 82 MG/DL (ref 40–60)
LDLC SERPL CALC-MCNC: 103 MG/DL (ref 0–100)
LDLC/HDLC SERPL: 1.26 {RATIO}
POTASSIUM SERPL-SCNC: 4.4 MMOL/L (ref 3.5–5.2)
PROT SERPL-MCNC: 8 G/DL (ref 6–8.5)
PSA SERPL-MCNC: 0.36 NG/ML (ref 0–4)
SODIUM SERPL-SCNC: 140 MMOL/L (ref 136–145)
TRIGL SERPL-MCNC: 60 MG/DL (ref 0–150)
VLDLC SERPL-MCNC: 12 MG/DL (ref 5–40)

## 2020-07-10 PROCEDURE — G0103 PSA SCREENING: HCPCS

## 2020-07-10 PROCEDURE — 86803 HEPATITIS C AB TEST: CPT

## 2020-07-10 PROCEDURE — 80053 COMPREHEN METABOLIC PANEL: CPT

## 2020-07-10 PROCEDURE — 99213 OFFICE O/P EST LOW 20 MIN: CPT | Performed by: NURSE PRACTITIONER

## 2020-07-10 PROCEDURE — 80061 LIPID PANEL: CPT

## 2020-07-10 PROCEDURE — 36415 COLL VENOUS BLD VENIPUNCTURE: CPT

## 2020-07-10 NOTE — PROGRESS NOTES
Subjective   Enrrique Cason is a 59 y.o. male.     Chief Complaint   Patient presents with   • Hypertension     3 month followup   • Hyperlipidemia       HPI  Patient is here for management of his chronic medical problems.hypertension, hyperlipidemia.    Hypertension: taking losartan 25 mg once day and amlodipine 10 mg once day. Well controlled on current medications. He reports no chest pain.he is feeling well.    Hyperlipidemia: 2/2020  tri 59  LDl 122. Taking lipitor 20 mg once day. Has hypertension.     Alcohol abuse: he continues to drink as reported. He is working daily as reported.     The following portions of the patient's history were reviewed and updated as appropriate: allergies, current medications, past family history, past medical history, past social history, past surgical history and problem list.      Current Outpatient Medications:   •  amLODIPine (NORVASC) 10 MG tablet, Take 1 tablet by mouth Daily., Disp: 90 tablet, Rfl: 1  •  atorvastatin (LIPITOR) 20 MG tablet, TAKE 1 TABLET BY MOUTH DAILY, Disp: 90 tablet, Rfl: 0  •  losartan (COZAAR) 25 MG tablet, TAKE 1 TABLET BY MOUTH DAILY, Disp: 90 tablet, Rfl: 0    Recent Results (from the past 4032 hour(s))   Lipid Panel    Collection Time: 02/21/20  9:16 AM   Result Value Ref Range    Total Cholesterol 249 (H) 0 - 200 mg/dL    Triglycerides 59 0 - 150 mg/dL    HDL Cholesterol 104 (H) 40 - 60 mg/dL    LDL Cholesterol  133 (H) 0 - 100 mg/dL    VLDL Cholesterol 11.8 5 - 40 mg/dL    LDL/HDL Ratio 1.28    Comprehensive Metabolic Panel    Collection Time: 02/21/20  9:16 AM   Result Value Ref Range    Glucose 145 (H) 65 - 99 mg/dL    BUN 13 6 - 20 mg/dL    Creatinine 0.80 0.76 - 1.27 mg/dL    Sodium 140 136 - 145 mmol/L    Potassium 4.4 3.5 - 5.2 mmol/L    Chloride 100 98 - 107 mmol/L    CO2 25.6 22.0 - 29.0 mmol/L    Calcium 10.2 8.6 - 10.5 mg/dL    Total Protein 8.3 6.0 - 8.5 g/dL    Albumin 5.00 3.50 - 5.20 g/dL    ALT (SGPT) 14 1 - 41 U/L     AST (SGOT) 17 1 - 40 U/L    Alkaline Phosphatase 121 (H) 39 - 117 U/L    Total Bilirubin 0.5 0.2 - 1.2 mg/dL    eGFR  African Amer 120 >60 mL/min/1.73    Globulin 3.3 gm/dL    A/G Ratio 1.5 g/dL    BUN/Creatinine Ratio 16.3 7.0 - 25.0    Anion Gap 14.4 5.0 - 15.0 mmol/L   Protein Electrophoresis, Total    Collection Time: 02/21/20  9:16 AM   Result Value Ref Range    Total Protein 8.1 6.0 - 8.5 g/dL    Albumin 4.6 (H) 2.9 - 4.4 g/dL    Alpha-1-Globulin 0.3 0.0 - 0.4 g/dL    Alpha-2-Globulin 0.6 0.4 - 1.0 g/dL    Beta Globulin 1.1 0.7 - 1.3 g/dL    Gamma Globulin 1.5 0.4 - 1.8 g/dL    M-Donis Not Observed Not Observed g/dL    Globulin 3.5 2.2 - 3.9 g/dL    A/G Ratio 1.3 0.7 - 1.7    Please note Comment    CBC Auto Differential    Collection Time: 02/21/20  9:16 AM   Result Value Ref Range    WBC 4.80 3.40 - 10.80 10*3/mm3    RBC 5.32 4.14 - 5.80 10*6/mm3    Hemoglobin 16.1 13.0 - 17.7 g/dL    Hematocrit 46.6 37.5 - 51.0 %    MCV 87.6 79.0 - 97.0 fL    MCH 30.3 26.6 - 33.0 pg    MCHC 34.5 31.5 - 35.7 g/dL    RDW 12.0 (L) 12.3 - 15.4 %    RDW-SD 38.5 37.0 - 54.0 fl    MPV 12.1 (H) 6.0 - 12.0 fL    Platelets 182 140 - 450 10*3/mm3    Neutrophil % 42.9 42.7 - 76.0 %    Lymphocyte % 46.5 (H) 19.6 - 45.3 %    Monocyte % 9.0 5.0 - 12.0 %    Eosinophil % 1.0 0.3 - 6.2 %    Basophil % 0.4 0.0 - 1.5 %    Immature Grans % 0.2 0.0 - 0.5 %    Neutrophils, Absolute 2.06 1.70 - 7.00 10*3/mm3    Lymphocytes, Absolute 2.23 0.70 - 3.10 10*3/mm3    Monocytes, Absolute 0.43 0.10 - 0.90 10*3/mm3    Eosinophils, Absolute 0.05 0.00 - 0.40 10*3/mm3    Basophils, Absolute 0.02 0.00 - 0.20 10*3/mm3    Immature Grans, Absolute 0.01 0.00 - 0.05 10*3/mm3    nRBC 0.0 0.0 - 0.2 /100 WBC         Review of Systems   Constitutional: Negative for chills and fever.   HENT: Negative for congestion, sinus pressure and swollen glands.    Eyes: Negative for blurred vision and pain.   Respiratory: Negative for cough and shortness of breath.   "  Cardiovascular: Negative for chest pain and leg swelling.   Gastrointestinal: Negative for abdominal pain, nausea and indigestion.   Endocrine: Negative for cold intolerance, heat intolerance, polydipsia, polyphagia and polyuria.   Genitourinary: Negative for flank pain and frequency.   Skin: Negative for dry skin, rash and bruise.   Psychiatric/Behavioral: Negative for dysphoric mood and stress.       Objective     /74 (BP Location: Left arm, Patient Position: Sitting, Cuff Size: Adult)   Pulse 64   Resp 20   Ht 188 cm (74\")   Wt 80.1 kg (176 lb 9.6 oz)   SpO2 98%   BMI 22.67 kg/m²     Physical Exam   Constitutional: He is oriented to person, place, and time. He appears well-developed and well-nourished.   HENT:   Head: Normocephalic and atraumatic.   Right Ear: External ear normal.   Left Ear: External ear normal.   Nose: Nose normal.   Mouth/Throat: Oropharynx is clear and moist. No oropharyngeal exudate.   Eyes: Pupils are equal, round, and reactive to light. Conjunctivae and EOM are normal.   Neck: Normal range of motion. Neck supple.   Cardiovascular: Normal rate, regular rhythm, normal heart sounds and intact distal pulses.   Pulmonary/Chest: Effort normal and breath sounds normal.   Abdominal: Soft. Bowel sounds are normal.   Musculoskeletal: Normal range of motion.   Neurological: He is alert and oriented to person, place, and time.   Skin: Skin is warm and dry.   Psychiatric: He has a normal mood and affect. His behavior is normal. Thought content normal.   Nursing note and vitals reviewed.        Assessment/Plan   Enrrique was seen today for hypertension and hyperlipidemia.    Diagnoses and all orders for this visit:    Hyperlipidemia, unspecified hyperlipidemia type  Comments:  labs ordered  Orders:  -     Comprehensive Metabolic Panel; Future  -     Lipid Panel; Future    Hypertension, unspecified type  Comments:  controlled well on current medications    Alcohol abuse  Comments:  understands " he drinks  daily and that he is alcoholic    Prostate cancer screening  -     PSA SCREENING; Future    Encounter for health-related screening  -     Hepatitis C antibody; Future      Patient Instructions   Follow up on lab work      Joanna Hair, JANINA    07/10/20

## 2020-08-13 RX ORDER — AMLODIPINE BESYLATE 10 MG/1
10 TABLET ORAL DAILY
Qty: 90 TABLET | Refills: 1 | Status: SHIPPED | OUTPATIENT
Start: 2020-08-13 | End: 2021-01-08 | Stop reason: SDUPTHER

## 2020-08-13 RX ORDER — LOSARTAN POTASSIUM 25 MG/1
25 TABLET ORAL DAILY
Qty: 90 TABLET | Refills: 0 | Status: SHIPPED | OUTPATIENT
Start: 2020-08-13 | End: 2020-11-08

## 2020-09-21 RX ORDER — ATORVASTATIN CALCIUM 20 MG/1
20 TABLET, FILM COATED ORAL DAILY
Qty: 90 TABLET | Refills: 0 | Status: SHIPPED | OUTPATIENT
Start: 2020-09-21 | End: 2020-12-23 | Stop reason: SDUPTHER

## 2020-11-08 RX ORDER — LOSARTAN POTASSIUM 25 MG/1
25 TABLET ORAL DAILY
Qty: 90 TABLET | Refills: 0 | Status: SHIPPED | OUTPATIENT
Start: 2020-11-08 | End: 2021-01-08 | Stop reason: SDUPTHER

## 2020-12-23 RX ORDER — ATORVASTATIN CALCIUM 20 MG/1
20 TABLET, FILM COATED ORAL DAILY
Qty: 90 TABLET | Refills: 0 | Status: SHIPPED | OUTPATIENT
Start: 2020-12-23 | End: 2021-01-08 | Stop reason: SDUPTHER

## 2021-01-08 ENCOUNTER — OFFICE VISIT (OUTPATIENT)
Dept: FAMILY MEDICINE CLINIC | Facility: CLINIC | Age: 61
End: 2021-01-08

## 2021-01-08 VITALS
TEMPERATURE: 96.9 F | SYSTOLIC BLOOD PRESSURE: 137 MMHG | BODY MASS INDEX: 22.43 KG/M2 | RESPIRATION RATE: 16 BRPM | WEIGHT: 174.8 LBS | HEIGHT: 74 IN | HEART RATE: 67 BPM | OXYGEN SATURATION: 99 % | DIASTOLIC BLOOD PRESSURE: 85 MMHG

## 2021-01-08 DIAGNOSIS — Z72.0 TOBACCO USE: ICD-10-CM

## 2021-01-08 DIAGNOSIS — E78.5 HYPERLIPIDEMIA, UNSPECIFIED HYPERLIPIDEMIA TYPE: Primary | ICD-10-CM

## 2021-01-08 DIAGNOSIS — I10 HYPERTENSION, UNSPECIFIED TYPE: ICD-10-CM

## 2021-01-08 DIAGNOSIS — E88.09 HYPERPROTEINEMIA: ICD-10-CM

## 2021-01-08 DIAGNOSIS — F10.10 ALCOHOL ABUSE: ICD-10-CM

## 2021-01-08 PROCEDURE — 99214 OFFICE O/P EST MOD 30 MIN: CPT | Performed by: NURSE PRACTITIONER

## 2021-01-08 RX ORDER — ATORVASTATIN CALCIUM 20 MG/1
20 TABLET, FILM COATED ORAL DAILY
Qty: 90 TABLET | Refills: 0 | Status: SHIPPED | OUTPATIENT
Start: 2021-01-08 | End: 2021-06-18

## 2021-01-08 RX ORDER — AMLODIPINE BESYLATE 10 MG/1
10 TABLET ORAL DAILY
Qty: 90 TABLET | Refills: 1 | Status: SHIPPED | OUTPATIENT
Start: 2021-01-08 | End: 2021-07-26

## 2021-01-08 RX ORDER — LOSARTAN POTASSIUM 25 MG/1
25 TABLET ORAL DAILY
Qty: 90 TABLET | Refills: 0 | Status: SHIPPED | OUTPATIENT
Start: 2021-01-08 | End: 2021-04-22

## 2021-01-08 NOTE — PROGRESS NOTES
Subjective   Enrrique Cason is a 60 y.o. male.     Chief Complaint   Patient presents with   • Hyperlipidemia     6 month follow up   • Hypertension   • Hyperproteinemia   • Alcohol Intoxication       HPI  Patient is here for management of her chronic medical problems: hyperlipidemia, hypertension, hyperproteinemia, alcohol use.    Hypertension; losartan 25 mg daily amlodipine 10 mg once day. He does not check but is active, not having chest pain not short of air with exertion.     Hyperlipidemia: taking atorvastatin 20 mg once day denies any muscle cramps. No chest pain.  normal tri 60 normal HDL 82 high LDl 103 high treated.    Alcohol use: he continues to drink daily but also works his job every day. He drinks alcohol and does drink a lot of water.     Tobacco use: she chews tobacco.    Health screening: we discussed immunizations and he declines he is very afraid of needles. He understands the risks and could be benefits.   He will agree to labs only.   His hep C screening was neg last labs reviewed. PSA normal. 7/2020.    Hyperproteinemia: he had protein electrophoresis albumin was elevated. Otherwise normal.     The following portions of the patient's history were reviewed and updated as appropriate: allergies, current medications, past family history, past medical history, past social history, past surgical history and problem list.      Current Outpatient Medications:   •  amLODIPine (NORVASC) 10 MG tablet, Take 1 tablet by mouth Daily., Disp: 90 tablet, Rfl: 1  •  atorvastatin (LIPITOR) 20 MG tablet, Take 1 tablet by mouth Daily., Disp: 90 tablet, Rfl: 0  •  losartan (COZAAR) 25 MG tablet, Take 1 tablet by mouth Daily., Disp: 90 tablet, Rfl: 0    No results found for this or any previous visit (from the past 4032 hour(s)).      Review of Systems   Cardiovascular: Negative for chest pain, palpitations and leg swelling.       Objective     /85 (BP Location: Left arm, Patient Position: Sitting,  "Cuff Size: Adult)   Pulse 67   Temp 96.9 °F (36.1 °C) (Temporal)   Resp 16   Ht 188 cm (74\")   Wt 79.3 kg (174 lb 12.8 oz)   SpO2 99%   BMI 22.44 kg/m²     Physical Exam  Vitals signs and nursing note reviewed.   Constitutional:       Appearance: Normal appearance.   HENT:      Head: Normocephalic and atraumatic.      Right Ear: Tympanic membrane, ear canal and external ear normal.      Left Ear: Tympanic membrane, ear canal and external ear normal.      Nose: Nose normal.      Mouth/Throat:      Mouth: Mucous membranes are moist.   Eyes:      Conjunctiva/sclera: Conjunctivae normal.      Pupils: Pupils are equal, round, and reactive to light.   Cardiovascular:      Rate and Rhythm: Normal rate and regular rhythm.      Pulses: Normal pulses.      Heart sounds: Normal heart sounds.   Pulmonary:      Effort: Pulmonary effort is normal.      Breath sounds: Normal breath sounds.   Abdominal:      General: Abdomen is flat. Bowel sounds are normal.      Palpations: Abdomen is soft.   Musculoskeletal: Normal range of motion.   Skin:     General: Skin is warm and dry.   Neurological:      General: No focal deficit present.      Mental Status: He is alert and oriented to person, place, and time.   Psychiatric:         Mood and Affect: Mood normal.         Behavior: Behavior normal.         Thought Content: Thought content normal.         Judgment: Judgment normal.           Assessment/Plan   Diagnoses and all orders for this visit:    1. Hyperlipidemia, unspecified hyperlipidemia type (Primary)  Comments:  labs next visit    2. Hypertension, unspecified type  Comments:  stable    3. Alcohol abuse  Comments:  stable    4. Hyperproteinemia  Comments:  stable    5. Tobacco use  Comments:  discussed cessation he is not wanting to.    Other orders  -     losartan (COZAAR) 25 MG tablet; Take 1 tablet by mouth Daily.  Dispense: 90 tablet; Refill: 0  -     atorvastatin (LIPITOR) 20 MG tablet; Take 1 tablet by mouth Daily.  " Dispense: 90 tablet; Refill: 0  -     amLODIPine (NORVASC) 10 MG tablet; Take 1 tablet by mouth Daily.  Dispense: 90 tablet; Refill: 1      Patient Instructions   Stop chewing tobacco  Limit alcohol  Continue current medications.       Joanna Hair, APRN    01/08/21

## 2021-04-22 RX ORDER — LOSARTAN POTASSIUM 25 MG/1
25 TABLET ORAL DAILY
Qty: 90 TABLET | Refills: 0 | Status: SHIPPED | OUTPATIENT
Start: 2021-04-22 | End: 2021-07-26

## 2021-06-18 RX ORDER — ATORVASTATIN CALCIUM 20 MG/1
20 TABLET, FILM COATED ORAL DAILY
Qty: 90 TABLET | Refills: 0 | Status: SHIPPED | OUTPATIENT
Start: 2021-06-18 | End: 2021-09-15

## 2021-07-02 ENCOUNTER — OFFICE VISIT (OUTPATIENT)
Dept: FAMILY MEDICINE CLINIC | Facility: CLINIC | Age: 61
End: 2021-07-02

## 2021-07-02 VITALS
HEIGHT: 74 IN | WEIGHT: 177.8 LBS | SYSTOLIC BLOOD PRESSURE: 129 MMHG | BODY MASS INDEX: 22.82 KG/M2 | TEMPERATURE: 96.8 F | DIASTOLIC BLOOD PRESSURE: 79 MMHG | OXYGEN SATURATION: 98 % | HEART RATE: 55 BPM

## 2021-07-02 DIAGNOSIS — E78.2 MIXED HYPERLIPIDEMIA: Chronic | ICD-10-CM

## 2021-07-02 DIAGNOSIS — I10 ESSENTIAL HYPERTENSION: Chronic | ICD-10-CM

## 2021-07-02 DIAGNOSIS — Z72.0 TOBACCO USE: Chronic | ICD-10-CM

## 2021-07-02 DIAGNOSIS — F10.10 ALCOHOL ABUSE: Chronic | ICD-10-CM

## 2021-07-02 DIAGNOSIS — Z12.5 PROSTATE CANCER SCREENING: ICD-10-CM

## 2021-07-02 PROCEDURE — 99214 OFFICE O/P EST MOD 30 MIN: CPT | Performed by: NURSE PRACTITIONER

## 2021-07-02 NOTE — PROGRESS NOTES
"Subjective   {CC  Problem List  Visit Diagnosis   Encounters  Notes  Medications  Labs  Result Review Imaging  Media :23}     Enrrique Cason is a 60 y.o. male.     Chief Complaint   Patient presents with   • Hyperlipidemia     6 month f/u   • Hypertension       History of Present Illness  Patient is here for management of his chronic medical problems: hyperlipidemia, hypertension.     Hyperlipidemia: reviewed labs  normal tri 60 normal dhl 82 high ldl 103 high 7/2020 taking atorvastatin 20 mg daily .     Hypertension: taking losartan 25 mg daily and amlodipine 10 mg he drinks daily. His blood pressure is stable.  He has controlled hyperlipidemia.    Alcohol abuse: he is still drinking daily. 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     Tobacco use he chews tobacco. He is not ready to quit.     Heart murmur: he has had since child denies chest pain not short of air with activity walks miles a day.         The following portions of the patient's history were reviewed and updated as appropriate: allergies, current medications, past family history, past medical history, past social history, past surgical history and problem list.      Current Outpatient Medications:   •  amLODIPine (NORVASC) 10 MG tablet, Take 1 tablet by mouth Daily., Disp: 90 tablet, Rfl: 1  •  atorvastatin (LIPITOR) 20 MG tablet, TAKE 1 TABLET BY MOUTH DAILY, Disp: 90 tablet, Rfl: 0  •  losartan (COZAAR) 25 MG tablet, TAKE 1 TABLET BY MOUTH DAILY, Disp: 90 tablet, Rfl: 0    No results found for this or any previous visit (from the past 4032 hour(s)).      Review of Systems    Objective     /79 (BP Location: Left arm, Patient Position: Sitting, Cuff Size: Adult)   Pulse 55   Temp 96.8 °F (36 °C) (Infrared)   Ht 188 cm (74\")   Wt 80.6 kg (177 lb 12.8 oz)   SpO2 98%   BMI 22.83 kg/m²     Physical Exam  Vitals and nursing note reviewed.   Constitutional:       Appearance: Normal appearance.   HENT:      Head: Normocephalic. "      Right Ear: Tympanic membrane and external ear normal.      Left Ear: Tympanic membrane and external ear normal.      Nose: Nose normal.      Mouth/Throat:      Mouth: Mucous membranes are moist.   Eyes:      Conjunctiva/sclera: Conjunctivae normal.      Pupils: Pupils are equal, round, and reactive to light.   Cardiovascular:      Rate and Rhythm: Normal rate and regular rhythm.      Pulses: Normal pulses.      Heart sounds: Murmur heard.     Pulmonary:      Effort: Pulmonary effort is normal.      Breath sounds: Normal breath sounds.   Abdominal:      General: Bowel sounds are normal.      Palpations: Abdomen is soft.   Musculoskeletal:         General: Normal range of motion.      Cervical back: Normal range of motion.   Skin:     General: Skin is warm and dry.      Capillary Refill: Capillary refill takes less than 2 seconds.   Neurological:      General: No focal deficit present.      Mental Status: He is alert and oriented to person, place, and time.   Psychiatric:         Mood and Affect: Mood normal.         Behavior: Behavior normal.         Thought Content: Thought content normal.         Judgment: Judgment normal.         Result Review :                Assessment/Plan    Diagnoses and all orders for this visit:    1. Mixed hyperlipidemia  Comments:  stable labs ordered  Orders:  -     Lipid Panel; Future  -     Comprehensive Metabolic Panel; Future    2. Essential hypertension  Comments:  stable  Orders:  -     Lipid Panel; Future  -     Comprehensive Metabolic Panel; Future    3. Prostate cancer screening  Comments:  ordered for after 7/10  Orders:  -     PSA SCREENING; Future    4. Alcohol abuse  Comments:  he is functioning he works he is a keli, he scraps metal. walks almost every where    5. Tobacco use  Comments:  continues to dip not ready to quit understands the risks      Patient Instructions   Follow up on lab work   Get your covid vaccine.  If you have any injury from dirt you will need  to have a tetanus vaccine      Follow Up   Return in about 6 months (around 1/2/2022).    Patient was given instructions and counseling regarding his condition or for health maintenance advice. Please see specific information pulled into the AVS if appropriate.     Joanna Hair, APRN    07/02/21

## 2021-07-02 NOTE — PATIENT INSTRUCTIONS
Follow up on lab work   Get your covid vaccine.  If you have any injury from dirt you will need to have a tetanus vaccine

## 2021-07-22 RX ORDER — LOSARTAN POTASSIUM 25 MG/1
25 TABLET ORAL DAILY
Qty: 90 TABLET | Refills: 0 | OUTPATIENT
Start: 2021-07-22

## 2021-07-22 RX ORDER — AMLODIPINE BESYLATE 10 MG/1
10 TABLET ORAL DAILY
Qty: 90 TABLET | Refills: 1 | OUTPATIENT
Start: 2021-07-22

## 2021-07-26 RX ORDER — LOSARTAN POTASSIUM 25 MG/1
25 TABLET ORAL DAILY
Qty: 90 TABLET | Refills: 0 | Status: SHIPPED | OUTPATIENT
Start: 2021-07-26 | End: 2021-11-11

## 2021-07-26 RX ORDER — AMLODIPINE BESYLATE 10 MG/1
10 TABLET ORAL DAILY
Qty: 90 TABLET | Refills: 1 | Status: SHIPPED | OUTPATIENT
Start: 2021-07-26 | End: 2021-11-11

## 2021-09-15 RX ORDER — ATORVASTATIN CALCIUM 20 MG/1
20 TABLET, FILM COATED ORAL DAILY
Qty: 90 TABLET | Refills: 0 | Status: SHIPPED | OUTPATIENT
Start: 2021-09-15 | End: 2021-12-17

## 2021-11-11 RX ORDER — AMLODIPINE BESYLATE 10 MG/1
10 TABLET ORAL DAILY
Qty: 90 TABLET | Refills: 1 | Status: SHIPPED | OUTPATIENT
Start: 2021-11-11 | End: 2022-07-01 | Stop reason: SDUPTHER

## 2021-11-11 RX ORDER — LOSARTAN POTASSIUM 25 MG/1
25 TABLET ORAL DAILY
Qty: 90 TABLET | Refills: 0 | Status: SHIPPED | OUTPATIENT
Start: 2021-11-11 | End: 2022-02-07 | Stop reason: SDUPTHER

## 2021-11-11 RX ORDER — LOSARTAN POTASSIUM 25 MG/1
25 TABLET ORAL DAILY
Qty: 90 TABLET | Refills: 0 | Status: SHIPPED | OUTPATIENT
Start: 2021-11-11 | End: 2022-07-01 | Stop reason: SDUPTHER

## 2021-12-17 RX ORDER — ATORVASTATIN CALCIUM 20 MG/1
20 TABLET, FILM COATED ORAL DAILY
Qty: 90 TABLET | Refills: 0 | Status: SHIPPED | OUTPATIENT
Start: 2021-12-17 | End: 2022-01-10

## 2022-01-07 ENCOUNTER — LAB (OUTPATIENT)
Dept: LAB | Facility: HOSPITAL | Age: 62
End: 2022-01-07

## 2022-01-07 ENCOUNTER — OFFICE VISIT (OUTPATIENT)
Dept: FAMILY MEDICINE CLINIC | Facility: CLINIC | Age: 62
End: 2022-01-07

## 2022-01-07 VITALS
HEIGHT: 74 IN | SYSTOLIC BLOOD PRESSURE: 150 MMHG | WEIGHT: 178 LBS | OXYGEN SATURATION: 98 % | HEART RATE: 65 BPM | TEMPERATURE: 96.9 F | BODY MASS INDEX: 22.84 KG/M2 | DIASTOLIC BLOOD PRESSURE: 76 MMHG

## 2022-01-07 DIAGNOSIS — E78.49 OTHER HYPERLIPIDEMIA: Chronic | ICD-10-CM

## 2022-01-07 DIAGNOSIS — Z72.0 TOBACCO USE: Chronic | ICD-10-CM

## 2022-01-07 DIAGNOSIS — I10 PRIMARY HYPERTENSION: Primary | Chronic | ICD-10-CM

## 2022-01-07 DIAGNOSIS — Z12.5 PROSTATE CANCER SCREENING: ICD-10-CM

## 2022-01-07 DIAGNOSIS — F10.10 ALCOHOL ABUSE: Chronic | ICD-10-CM

## 2022-01-07 DIAGNOSIS — I10 PRIMARY HYPERTENSION: Chronic | ICD-10-CM

## 2022-01-07 LAB
ALBUMIN SERPL-MCNC: 5.3 G/DL (ref 3.5–5.2)
ALBUMIN/GLOB SERPL: 1.8 G/DL
ALP SERPL-CCNC: 125 U/L (ref 39–117)
ALT SERPL W P-5'-P-CCNC: 21 U/L (ref 1–41)
ANION GAP SERPL CALCULATED.3IONS-SCNC: 10.7 MMOL/L (ref 5–15)
AST SERPL-CCNC: 19 U/L (ref 1–40)
BILIRUB SERPL-MCNC: 0.6 MG/DL (ref 0–1.2)
BUN SERPL-MCNC: 12 MG/DL (ref 8–23)
BUN/CREAT SERPL: 15.2 (ref 7–25)
CALCIUM SPEC-SCNC: 10.3 MG/DL (ref 8.6–10.5)
CHLORIDE SERPL-SCNC: 102 MMOL/L (ref 98–107)
CHOLEST SERPL-MCNC: 228 MG/DL (ref 0–200)
CO2 SERPL-SCNC: 30.3 MMOL/L (ref 22–29)
CREAT SERPL-MCNC: 0.79 MG/DL (ref 0.76–1.27)
GFR SERPL CREATININE-BSD FRML MDRD: 121 ML/MIN/1.73
GLOBULIN UR ELPH-MCNC: 3 GM/DL
GLUCOSE SERPL-MCNC: 102 MG/DL (ref 65–99)
HDLC SERPL-MCNC: 96 MG/DL (ref 40–60)
LDLC SERPL CALC-MCNC: 116 MG/DL (ref 0–100)
LDLC/HDLC SERPL: 1.18 {RATIO}
POTASSIUM SERPL-SCNC: 4.1 MMOL/L (ref 3.5–5.2)
PROT SERPL-MCNC: 8.3 G/DL (ref 6–8.5)
PSA SERPL-MCNC: 0.5 NG/ML (ref 0–4)
SODIUM SERPL-SCNC: 143 MMOL/L (ref 136–145)
TRIGL SERPL-MCNC: 94 MG/DL (ref 0–150)
VLDLC SERPL-MCNC: 16 MG/DL (ref 5–40)

## 2022-01-07 PROCEDURE — 99214 OFFICE O/P EST MOD 30 MIN: CPT | Performed by: NURSE PRACTITIONER

## 2022-01-07 PROCEDURE — G0103 PSA SCREENING: HCPCS

## 2022-01-07 PROCEDURE — 36415 COLL VENOUS BLD VENIPUNCTURE: CPT

## 2022-01-07 PROCEDURE — 80053 COMPREHEN METABOLIC PANEL: CPT

## 2022-01-07 PROCEDURE — 80061 LIPID PANEL: CPT

## 2022-01-07 NOTE — PATIENT INSTRUCTIONS
Get the shingles vaccine   Follow up on lab results.   Take medications daily.   Schedule eye exam.

## 2022-01-07 NOTE — PROGRESS NOTES
"Subjective        Enrrique Cason is a 61 y.o. male.     Chief Complaint   Patient presents with   • Hypertension     6 month f/u   • Hyperlipidemia       History of Present Illness  Patient is here for management of his chronic medical problems: hypertension and hyperlipidemia, alcohol use.     Hypertension taking losartan 25 mg once da amlodipine 10 mg once day . He denies chest pain not short of air not having difficulty walking or breathing.     Hyperlipidemia: taking atorvastatin 20 mg once. He has hypertension.    Prostate cancer screening: no recent labs.       The following portions of the patient's history were reviewed and updated as appropriate: allergies, current medications, past family history, past medical history, past social history, past surgical history and problem list.      Current Outpatient Medications:   •  amLODIPine (NORVASC) 10 MG tablet, TAKE 1 TABLET BY MOUTH DAILY, Disp: 90 tablet, Rfl: 1  •  atorvastatin (LIPITOR) 20 MG tablet, TAKE 1 TABLET BY MOUTH DAILY, Disp: 90 tablet, Rfl: 0  •  losartan (COZAAR) 25 MG tablet, TAKE 1 TABLET BY MOUTH DAILY, Disp: 90 tablet, Rfl: 0  •  losartan (COZAAR) 25 MG tablet, TAKE 1 TABLET BY MOUTH DAILY, Disp: 90 tablet, Rfl: 0    No results found for this or any previous visit (from the past 4032 hour(s)).      Review of Systems    Objective     /76 (BP Location: Left arm, Patient Position: Sitting, Cuff Size: Adult)   Pulse 65   Temp 96.9 °F (36.1 °C) (Infrared)   Ht 188 cm (74\")   Wt 80.7 kg (178 lb)   SpO2 98%   BMI 22.85 kg/m²     Physical Exam  Vitals and nursing note reviewed.   Constitutional:       Appearance: Normal appearance.   HENT:      Head: Normocephalic.      Nose: Nose normal.      Mouth/Throat:      Mouth: Mucous membranes are moist.      Comments: No dentures  Eyes:      Conjunctiva/sclera: Conjunctivae normal.      Pupils: Pupils are equal, round, and reactive to light.   Cardiovascular:      Rate and Rhythm: Normal rate and " regular rhythm.      Pulses: Normal pulses.      Heart sounds: Normal heart sounds.   Pulmonary:      Effort: Pulmonary effort is normal.      Breath sounds: Normal breath sounds.   Abdominal:      General: Bowel sounds are normal.      Palpations: Abdomen is soft.   Musculoskeletal:         General: Normal range of motion.      Cervical back: Normal range of motion.   Skin:     General: Skin is warm and dry.      Capillary Refill: Capillary refill takes less than 2 seconds.   Neurological:      General: No focal deficit present.      Mental Status: He is alert and oriented to person, place, and time.   Psychiatric:         Mood and Affect: Mood normal.         Behavior: Behavior normal.         Thought Content: Thought content normal.         Judgment: Judgment normal.         Result Review :                Assessment/Plan    Diagnoses and all orders for this visit:    1. Primary hypertension (Primary)  Comments:  stable  Orders:  -     Lipid Panel; Future  -     Comprehensive Metabolic Panel; Future    2. Other hyperlipidemia  Comments:  stable labs ordered no refills until completed.  Orders:  -     Lipid Panel; Future  -     Comprehensive Metabolic Panel; Future    3. Prostate cancer screening  Comments:  stressed having testing done.   Orders:  -     PSA SCREENING; Future    4. Alcohol abuse  Comments:  He is still drinking but said not as much. he works every day. manages his own finances.     5. Tobacco use  Comments:  continues to chew tobacco      Patient Instructions   Get the shingles vaccine   Follow up on lab results.   Take medications daily.   Schedule eye exam.         Follow Up   Return in about 6 months (around 7/7/2022).    Patient was given instructions and counseling regarding his condition or for health maintenance advice. Please see specific information pulled into the AVS if appropriate.     Joanna Hair, APRN    01/07/22

## 2022-01-10 RX ORDER — ATORVASTATIN CALCIUM 40 MG/1
20 TABLET, FILM COATED ORAL DAILY
Qty: 90 TABLET | Refills: 1 | Status: SHIPPED | OUTPATIENT
Start: 2022-01-10 | End: 2022-07-01 | Stop reason: SDUPTHER

## 2022-02-07 RX ORDER — LOSARTAN POTASSIUM 25 MG/1
25 TABLET ORAL DAILY
Qty: 90 TABLET | Refills: 0 | Status: SHIPPED | OUTPATIENT
Start: 2022-02-07 | End: 2022-05-06

## 2022-05-06 RX ORDER — LOSARTAN POTASSIUM 25 MG/1
25 TABLET ORAL DAILY
Qty: 90 TABLET | Refills: 0 | Status: SHIPPED | OUTPATIENT
Start: 2022-05-06 | End: 2022-07-16

## 2022-07-01 ENCOUNTER — OFFICE VISIT (OUTPATIENT)
Dept: FAMILY MEDICINE CLINIC | Facility: CLINIC | Age: 62
End: 2022-07-01

## 2022-07-01 VITALS
SYSTOLIC BLOOD PRESSURE: 131 MMHG | HEART RATE: 61 BPM | DIASTOLIC BLOOD PRESSURE: 74 MMHG | RESPIRATION RATE: 16 BRPM | WEIGHT: 182 LBS | OXYGEN SATURATION: 95 % | BODY MASS INDEX: 23.36 KG/M2 | HEIGHT: 74 IN | TEMPERATURE: 97 F

## 2022-07-01 DIAGNOSIS — F10.10 ALCOHOL ABUSE: ICD-10-CM

## 2022-07-01 DIAGNOSIS — E78.2 MIXED HYPERLIPIDEMIA: Chronic | ICD-10-CM

## 2022-07-01 DIAGNOSIS — Z72.0 TOBACCO USE: Chronic | ICD-10-CM

## 2022-07-01 DIAGNOSIS — I10 PRIMARY HYPERTENSION: Chronic | ICD-10-CM

## 2022-07-01 PROCEDURE — 99213 OFFICE O/P EST LOW 20 MIN: CPT | Performed by: NURSE PRACTITIONER

## 2022-07-01 RX ORDER — AMLODIPINE BESYLATE 10 MG/1
10 TABLET ORAL DAILY
Qty: 90 TABLET | Refills: 1 | Status: SHIPPED | OUTPATIENT
Start: 2022-07-01 | End: 2023-01-29

## 2022-07-01 RX ORDER — ATORVASTATIN CALCIUM 40 MG/1
20 TABLET, FILM COATED ORAL DAILY
Qty: 45 TABLET | Refills: 1 | Status: SHIPPED | OUTPATIENT
Start: 2022-07-01

## 2022-07-01 NOTE — PATIENT INSTRUCTIONS
Follow up on your lab results .   Stop daily alcohol.  Stop tobacco products  Get your covid vaccine.

## 2022-07-01 NOTE — PROGRESS NOTES
"Subjective        Enrrique Cason is a 61 y.o. male.     Chief Complaint   Patient presents with   • Hypertension     6 month follow up   • Hyperlipidemia       History of Present Illness  Patient is here for management of his chronic medical problems: hypertension, hyperlipidemia,     Hypertension: taking amlodipine 10 mg daily, losartan 25 mg daily.    Hyperlipidemia : taking atorvastatin 40 mg he cuts them in half says whole dose makes him feel strange.  at night.  He has hypertension well controlled.    Tobacco use; he chews and is not interested in quitting.     Alcohol use daily. He understands risks.       The following portions of the patient's history were reviewed and updated as appropriate: allergies, current medications, past family history, past medical history, past social history, past surgical history and problem list.      Current Outpatient Medications:   •  amLODIPine (NORVASC) 10 MG tablet, Take 1 tablet by mouth Daily., Disp: 90 tablet, Rfl: 1  •  atorvastatin (LIPITOR) 40 MG tablet, Take 0.5 tablets by mouth Daily., Disp: 45 tablet, Rfl: 1  •  losartan (COZAAR) 25 MG tablet, TAKE 1 TABLET BY MOUTH DAILY, Disp: 90 tablet, Rfl: 0    No results found for this or any previous visit (from the past 4032 hour(s)).      Review of Systems   Constitutional: Negative for unexpected weight gain and unexpected weight loss.   HENT: Negative.    Respiratory: Negative for cough and wheezing.    Cardiovascular: Negative for chest pain, palpitations and leg swelling.       Objective     /74 (BP Location: Left arm, Patient Position: Sitting, Cuff Size: Adult)   Pulse 61   Temp 97 °F (36.1 °C) (Infrared)   Resp 16   Ht 188 cm (74\")   Wt 82.6 kg (182 lb)   SpO2 95%   BMI 23.37 kg/m²     Physical Exam  Vitals and nursing note reviewed.   Constitutional:       Appearance: Normal appearance.   HENT:      Head: Normocephalic.      Right Ear: External ear normal.      Left Ear: External ear normal.      " Mouth/Throat:      Mouth: Mucous membranes are moist.   Eyes:      Conjunctiva/sclera: Conjunctivae normal.      Pupils: Pupils are equal, round, and reactive to light.   Cardiovascular:      Rate and Rhythm: Normal rate and regular rhythm.      Pulses: Normal pulses.      Heart sounds: Normal heart sounds.   Pulmonary:      Effort: Pulmonary effort is normal.      Breath sounds: Normal breath sounds.   Abdominal:      General: Bowel sounds are normal.      Palpations: Abdomen is soft.   Musculoskeletal:         General: Normal range of motion.      Cervical back: Neck supple.   Skin:     General: Skin is warm and dry.      Capillary Refill: Capillary refill takes less than 2 seconds.   Neurological:      Mental Status: He is alert and oriented to person, place, and time.   Psychiatric:         Mood and Affect: Mood normal.         Behavior: Behavior normal.         Thought Content: Thought content normal.         Judgment: Judgment normal.         Result Review :                Assessment & Plan    Diagnoses and all orders for this visit:    1. Tobacco use  Comments:  discussed chewing tobacco    2. Alcohol abuse  Comments:  discussed daily alcohol he understands the risks involved. he works every day.     3. Mixed hyperlipidemia  Comments:  stable  Orders:  -     Cancel: Lipid Panel; Future  -     Cancel: Comprehensive Metabolic Panel; Future    4. Primary hypertension  Comments:  stable    Other orders  -     amLODIPine (NORVASC) 10 MG tablet; Take 1 tablet by mouth Daily.  Dispense: 90 tablet; Refill: 1  -     atorvastatin (LIPITOR) 40 MG tablet; Take 0.5 tablets by mouth Daily.  Dispense: 45 tablet; Refill: 1      Patient Instructions   Follow up on your lab results .   Stop daily alcohol.  Stop tobacco products  Get your covid vaccine.       Follow Up   Return in about 6 months (around 1/1/2023).    Patient was given instructions and counseling regarding his condition or for health maintenance advice. Please  see specific information pulled into the AVS if appropriate.     Joanna Hair, APRN    07/01/22

## 2022-07-16 RX ORDER — LOSARTAN POTASSIUM 25 MG/1
25 TABLET ORAL DAILY
Qty: 90 TABLET | Refills: 0 | Status: SHIPPED | OUTPATIENT
Start: 2022-07-16 | End: 2022-10-25

## 2022-10-25 RX ORDER — LOSARTAN POTASSIUM 25 MG/1
25 TABLET ORAL DAILY
Qty: 90 TABLET | Refills: 0 | Status: SHIPPED | OUTPATIENT
Start: 2022-10-25 | End: 2023-01-30 | Stop reason: SDUPTHER

## 2023-01-06 ENCOUNTER — OFFICE VISIT (OUTPATIENT)
Dept: FAMILY MEDICINE CLINIC | Facility: CLINIC | Age: 63
End: 2023-01-06
Payer: MEDICAID

## 2023-01-06 VITALS
BODY MASS INDEX: 23.1 KG/M2 | HEIGHT: 74 IN | SYSTOLIC BLOOD PRESSURE: 126 MMHG | TEMPERATURE: 98.1 F | HEART RATE: 64 BPM | OXYGEN SATURATION: 100 % | WEIGHT: 180 LBS | DIASTOLIC BLOOD PRESSURE: 74 MMHG

## 2023-01-06 DIAGNOSIS — Z12.5 PROSTATE CANCER SCREENING: Chronic | ICD-10-CM

## 2023-01-06 DIAGNOSIS — Z72.0 TOBACCO USE: Chronic | ICD-10-CM

## 2023-01-06 DIAGNOSIS — E78.2 MIXED HYPERLIPIDEMIA: Primary | ICD-10-CM

## 2023-01-06 DIAGNOSIS — I10 PRIMARY HYPERTENSION: Chronic | ICD-10-CM

## 2023-01-06 PROCEDURE — 99214 OFFICE O/P EST MOD 30 MIN: CPT | Performed by: NURSE PRACTITIONER

## 2023-01-06 NOTE — PATIENT INSTRUCTIONS
Follow up on labs.   Get your eyes examined.  Limit alcohol.  Stop dipping always look at your mouth and under your tongue.

## 2023-01-06 NOTE — PROGRESS NOTES
Subjective        Enrrique Cason is a 62 y.o. male.     Chief Complaint   Patient presents with   • Hyperlipidemia   • Hypertension     6 month f/u       History of Present Illness  Patient is here for management of his chronic medical problems: hypertension, hyperlipidemia.    Hypertension: he is always nervous when he has to come in.   Amlodipine 10 mg daily losartan 25 mg daily.     Hyperlipidemia atorvastatin taking 1/2 of 40 mg daily.     Tobacco abuse he still chews.     Alcohol use: he says daily 3-4 beers on good day. He does drink whiskey seldom though.         The following portions of the patient's history were reviewed and updated as appropriate: allergies, current medications, past family history, past medical history, past social history, past surgical history and problem list.      Current Outpatient Medications:   •  amLODIPine (NORVASC) 10 MG tablet, Take 1 tablet by mouth Daily., Disp: 90 tablet, Rfl: 1  •  atorvastatin (LIPITOR) 40 MG tablet, Take 0.5 tablets by mouth Daily., Disp: 45 tablet, Rfl: 1  •  losartan (COZAAR) 25 MG tablet, TAKE 1 TABLET BY MOUTH DAILY, Disp: 90 tablet, Rfl: 0    No results found for this or any previous visit (from the past 4032 hour(s)).      Review of Systems    Objective     /74   Pulse 64   Temp 98.1 °F (36.7 °C) (Infrared)   Ht 188 cm (74\")   Wt 81.6 kg (180 lb)   SpO2 100%   BMI 23.11 kg/m²     Physical Exam  Vitals and nursing note reviewed.   Constitutional:       Appearance: Normal appearance.   HENT:      Head: Normocephalic.      Right Ear: External ear normal.      Left Ear: External ear normal.      Nose: Nose normal.      Mouth/Throat:      Mouth: Mucous membranes are moist.   Eyes:      Conjunctiva/sclera: Conjunctivae normal.      Pupils: Pupils are equal, round, and reactive to light.   Cardiovascular:      Rate and Rhythm: Normal rate and regular rhythm.      Pulses: Normal pulses.      Heart sounds: Normal heart sounds.   Pulmonary:       Effort: Pulmonary effort is normal.      Breath sounds: Normal breath sounds.   Abdominal:      General: Bowel sounds are normal.      Palpations: Abdomen is soft.   Musculoskeletal:      Cervical back: Neck supple.   Skin:     General: Skin is warm and dry.      Capillary Refill: Capillary refill takes less than 2 seconds.   Neurological:      General: No focal deficit present.      Mental Status: He is alert and oriented to person, place, and time.   Psychiatric:         Mood and Affect: Mood normal.         Behavior: Behavior normal.         Thought Content: Thought content normal.         Judgment: Judgment normal.         Result Review :                Assessment & Plan    Diagnoses and all orders for this visit:    1. Mixed hyperlipidemia (Primary)  Comments:  stable  Orders:  -     Lipid Panel; Future  -     Comprehensive Metabolic Panel; Future    2. Primary hypertension  Comments:  stable  Orders:  -     Lipid Panel; Future  -     Comprehensive Metabolic Panel; Future    3. Prostate cancer screening  Comments:  labs ordered  Orders:  -     PSA Screen; Future    4. Tobacco use  Comments:  he dips      Patient Instructions   Follow up on labs.   Get your eyes examined.  Limit alcohol.  Stop dipping always look at your mouth and under your tongue.       Follow Up   Return in about 6 months (around 7/6/2023).    Patient was given instructions and counseling regarding his condition or for health maintenance advice. Please see specific information pulled into the AVS if appropriate.     Joanna Hair, APRN    01/06/23

## 2023-01-09 ENCOUNTER — LAB (OUTPATIENT)
Dept: LAB | Facility: HOSPITAL | Age: 63
End: 2023-01-09
Payer: COMMERCIAL

## 2023-01-09 DIAGNOSIS — I10 PRIMARY HYPERTENSION: Chronic | ICD-10-CM

## 2023-01-09 DIAGNOSIS — Z12.5 PROSTATE CANCER SCREENING: Chronic | ICD-10-CM

## 2023-01-09 DIAGNOSIS — E78.2 MIXED HYPERLIPIDEMIA: ICD-10-CM

## 2023-01-09 PROCEDURE — G0103 PSA SCREENING: HCPCS

## 2023-01-09 PROCEDURE — 80061 LIPID PANEL: CPT

## 2023-01-09 PROCEDURE — 36415 COLL VENOUS BLD VENIPUNCTURE: CPT

## 2023-01-09 PROCEDURE — 80053 COMPREHEN METABOLIC PANEL: CPT

## 2023-01-10 LAB
ALBUMIN SERPL-MCNC: 4.5 G/DL (ref 3.5–5.2)
ALBUMIN/GLOB SERPL: 1.4 G/DL
ALP SERPL-CCNC: 107 U/L (ref 39–117)
ALT SERPL W P-5'-P-CCNC: 17 U/L (ref 1–41)
ANION GAP SERPL CALCULATED.3IONS-SCNC: 10.3 MMOL/L (ref 5–15)
AST SERPL-CCNC: 15 U/L (ref 1–40)
BILIRUB SERPL-MCNC: 0.3 MG/DL (ref 0–1.2)
BUN SERPL-MCNC: 17 MG/DL (ref 8–23)
BUN/CREAT SERPL: 21.8 (ref 7–25)
CALCIUM SPEC-SCNC: 9.9 MG/DL (ref 8.6–10.5)
CHLORIDE SERPL-SCNC: 103 MMOL/L (ref 98–107)
CHOLEST SERPL-MCNC: 222 MG/DL (ref 0–200)
CO2 SERPL-SCNC: 27.7 MMOL/L (ref 22–29)
CREAT SERPL-MCNC: 0.78 MG/DL (ref 0.76–1.27)
EGFRCR SERPLBLD CKD-EPI 2021: 100.8 ML/MIN/1.73
GLOBULIN UR ELPH-MCNC: 3.2 GM/DL
GLUCOSE SERPL-MCNC: 99 MG/DL (ref 65–99)
HDLC SERPL-MCNC: 83 MG/DL (ref 40–60)
LDLC SERPL CALC-MCNC: 127 MG/DL (ref 0–100)
LDLC/HDLC SERPL: 1.51 {RATIO}
POTASSIUM SERPL-SCNC: 3.7 MMOL/L (ref 3.5–5.2)
PROT SERPL-MCNC: 7.7 G/DL (ref 6–8.5)
PSA SERPL-MCNC: 0.39 NG/ML (ref 0–4)
SODIUM SERPL-SCNC: 141 MMOL/L (ref 136–145)
TRIGL SERPL-MCNC: 67 MG/DL (ref 0–150)
VLDLC SERPL-MCNC: 12 MG/DL (ref 5–40)

## 2023-01-29 RX ORDER — AMLODIPINE BESYLATE 10 MG/1
10 TABLET ORAL DAILY
Qty: 90 TABLET | Refills: 1 | Status: SHIPPED | OUTPATIENT
Start: 2023-01-29

## 2023-01-30 ENCOUNTER — TELEPHONE (OUTPATIENT)
Dept: FAMILY MEDICINE CLINIC | Facility: CLINIC | Age: 63
End: 2023-01-30
Payer: COMMERCIAL

## 2023-01-30 RX ORDER — LOSARTAN POTASSIUM 25 MG/1
25 TABLET ORAL DAILY
Qty: 90 TABLET | Refills: 0 | Status: SHIPPED | OUTPATIENT
Start: 2023-01-30

## 2023-04-28 RX ORDER — LOSARTAN POTASSIUM 25 MG/1
25 TABLET ORAL DAILY
Qty: 90 TABLET | Refills: 0 | Status: SHIPPED | OUTPATIENT
Start: 2023-04-28

## 2023-06-19 ENCOUNTER — TELEPHONE (OUTPATIENT)
Dept: FAMILY MEDICINE CLINIC | Facility: CLINIC | Age: 63
End: 2023-06-19
Payer: COMMERCIAL

## 2023-06-19 RX ORDER — ATORVASTATIN CALCIUM 40 MG/1
20 TABLET, FILM COATED ORAL DAILY
Qty: 45 TABLET | Refills: 1 | Status: SHIPPED | OUTPATIENT
Start: 2023-06-19

## 2023-07-28 RX ORDER — AMLODIPINE BESYLATE 10 MG/1
10 TABLET ORAL DAILY
Qty: 90 TABLET | Refills: 1 | Status: SHIPPED | OUTPATIENT
Start: 2023-07-28

## 2023-07-28 RX ORDER — LOSARTAN POTASSIUM 25 MG/1
25 TABLET ORAL DAILY
Qty: 90 TABLET | Refills: 0 | Status: SHIPPED | OUTPATIENT
Start: 2023-07-28

## 2023-10-25 ENCOUNTER — TELEPHONE (OUTPATIENT)
Dept: FAMILY MEDICINE CLINIC | Facility: CLINIC | Age: 63
End: 2023-10-25
Payer: COMMERCIAL

## 2023-10-26 RX ORDER — LOSARTAN POTASSIUM 25 MG/1
25 TABLET ORAL DAILY
Qty: 90 TABLET | Refills: 0 | Status: SHIPPED | OUTPATIENT
Start: 2023-10-26

## 2023-11-29 ENCOUNTER — TELEPHONE (OUTPATIENT)
Dept: FAMILY MEDICINE CLINIC | Facility: CLINIC | Age: 63
End: 2023-11-29
Payer: COMMERCIAL

## 2023-11-30 RX ORDER — ATORVASTATIN CALCIUM 40 MG/1
20 TABLET, FILM COATED ORAL DAILY
Qty: 45 TABLET | Refills: 1 | Status: SHIPPED | OUTPATIENT
Start: 2023-11-30

## 2024-01-05 ENCOUNTER — OFFICE VISIT (OUTPATIENT)
Dept: FAMILY MEDICINE CLINIC | Facility: CLINIC | Age: 64
End: 2024-01-05
Payer: COMMERCIAL

## 2024-01-05 VITALS
WEIGHT: 190 LBS | HEIGHT: 74 IN | HEART RATE: 71 BPM | BODY MASS INDEX: 24.38 KG/M2 | SYSTOLIC BLOOD PRESSURE: 140 MMHG | TEMPERATURE: 98.2 F | DIASTOLIC BLOOD PRESSURE: 80 MMHG | OXYGEN SATURATION: 97 %

## 2024-01-05 DIAGNOSIS — I10 PRIMARY HYPERTENSION: Primary | ICD-10-CM

## 2024-01-05 DIAGNOSIS — Z12.5 PROSTATE CANCER SCREENING: ICD-10-CM

## 2024-01-05 DIAGNOSIS — F10.10 ALCOHOL ABUSE: ICD-10-CM

## 2024-01-05 DIAGNOSIS — E78.2 MIXED HYPERLIPIDEMIA: ICD-10-CM

## 2024-01-05 PROCEDURE — 99214 OFFICE O/P EST MOD 30 MIN: CPT | Performed by: NURSE PRACTITIONER

## 2024-01-05 NOTE — PROGRESS NOTES
"Subjective        Enrrique Cason is a 63 y.o. male.     Chief Complaint   Patient presents with    Hypertension     6 month f/u       History of Present Illness  Patient is here for management of his chronic medical problems: hypertension, hyperlipidemia, alcohol use, tobacco use.    Hypertension: taking amlodipine 10 mg daily losartan 25 mg daily.   She is drinking alcohol daily.     Hyperlipidemia: taking atorvastatin 40mg daily. He has hypertension.     Alcohol use he continues to drink daily. Understands risks. He works daily and manages his own finances.     Tobacco use: he continues to chew tobacco.      The following portions of the patient's history were reviewed and updated as appropriate: allergies, current medications, past family history, past medical history, past social history, past surgical history and problem list.      Current Outpatient Medications:     amLODIPine (NORVASC) 10 MG tablet, TAKE 1 TABLET BY MOUTH DAILY, Disp: 90 tablet, Rfl: 1    atorvastatin (LIPITOR) 40 MG tablet, Take 0.5 tablets by mouth Daily., Disp: 45 tablet, Rfl: 1    losartan (COZAAR) 25 MG tablet, Take 1 tablet by mouth Daily., Disp: 90 tablet, Rfl: 0    No results found for this or any previous visit (from the past 4032 hour(s)).      Review of Systems    Objective     /80   Pulse 71   Temp 98.2 °F (36.8 °C) (Infrared)   Ht 188 cm (74\")   Wt 86.2 kg (190 lb)   SpO2 97%   BMI 24.39 kg/m²     Physical Exam  Vitals and nursing note reviewed.   Constitutional:       Appearance: Normal appearance. He is normal weight.   HENT:      Head: Normocephalic.      Right Ear: External ear normal.      Left Ear: External ear normal.      Nose: Nose normal.      Mouth/Throat:      Mouth: Mucous membranes are moist.   Eyes:      Pupils: Pupils are equal, round, and reactive to light.   Cardiovascular:      Rate and Rhythm: Normal rate and regular rhythm.      Pulses: Normal pulses.      Heart sounds: Normal heart sounds. "   Pulmonary:      Effort: Pulmonary effort is normal.      Breath sounds: Normal breath sounds.   Abdominal:      General: Bowel sounds are normal.      Palpations: Abdomen is soft.   Musculoskeletal:         General: Normal range of motion.      Cervical back: Neck supple.   Skin:     General: Skin is warm.   Neurological:      General: No focal deficit present.      Mental Status: He is alert and oriented to person, place, and time.   Psychiatric:         Mood and Affect: Mood normal.         Thought Content: Thought content normal.         Judgment: Judgment normal.         Result Review :                Assessment & Plan    Diagnoses and all orders for this visit:    1. Primary hypertension (Primary)  -     Lipid Panel; Future  -     Comprehensive Metabolic Panel; Future    2. Mixed hyperlipidemia  -     Lipid Panel; Future  -     Comprehensive Metabolic Panel; Future    3. Alcohol abuse  -     Comprehensive Metabolic Panel; Future    4. Prostate cancer screening  -     PSA Screen; Future      Patient Instructions   Continue monitor blood pressure  Eat healthy  Limit alcohol  Stop chewing tobacco.    Follow Up   Return in about 6 months (around 7/5/2024).    Patient was given instructions and counseling regarding his condition or for health maintenance advice. Please see specific information pulled into the AVS if appropriate.     Joanna Hair, APRN    01/05/24

## 2024-01-19 ENCOUNTER — LAB (OUTPATIENT)
Dept: LAB | Facility: HOSPITAL | Age: 64
End: 2024-01-19
Payer: COMMERCIAL

## 2024-01-19 DIAGNOSIS — Z12.5 PROSTATE CANCER SCREENING: ICD-10-CM

## 2024-01-19 DIAGNOSIS — I10 PRIMARY HYPERTENSION: ICD-10-CM

## 2024-01-19 DIAGNOSIS — F10.10 ALCOHOL ABUSE: ICD-10-CM

## 2024-01-19 DIAGNOSIS — E78.2 MIXED HYPERLIPIDEMIA: ICD-10-CM

## 2024-01-19 LAB
ALBUMIN SERPL-MCNC: 4.9 G/DL (ref 3.5–5.2)
ALBUMIN/GLOB SERPL: 1.6 G/DL
ALP SERPL-CCNC: 113 U/L (ref 39–117)
ALT SERPL W P-5'-P-CCNC: 18 U/L (ref 1–41)
ANION GAP SERPL CALCULATED.3IONS-SCNC: 11 MMOL/L (ref 5–15)
AST SERPL-CCNC: 20 U/L (ref 1–40)
BILIRUB SERPL-MCNC: 0.5 MG/DL (ref 0–1.2)
BUN SERPL-MCNC: 8 MG/DL (ref 8–23)
BUN/CREAT SERPL: 10.4 (ref 7–25)
CALCIUM SPEC-SCNC: 10.1 MG/DL (ref 8.6–10.5)
CHLORIDE SERPL-SCNC: 103 MMOL/L (ref 98–107)
CHOLEST SERPL-MCNC: 208 MG/DL (ref 0–200)
CO2 SERPL-SCNC: 26 MMOL/L (ref 22–29)
CREAT SERPL-MCNC: 0.77 MG/DL (ref 0.76–1.27)
EGFRCR SERPLBLD CKD-EPI 2021: 100.6 ML/MIN/1.73
GLOBULIN UR ELPH-MCNC: 3 GM/DL
GLUCOSE SERPL-MCNC: 114 MG/DL (ref 65–99)
HDLC SERPL-MCNC: 92 MG/DL (ref 40–60)
LDLC SERPL CALC-MCNC: 106 MG/DL (ref 0–100)
LDLC/HDLC SERPL: 1.13 {RATIO}
POTASSIUM SERPL-SCNC: 4.3 MMOL/L (ref 3.5–5.2)
PROT SERPL-MCNC: 7.9 G/DL (ref 6–8.5)
PSA SERPL-MCNC: 0.55 NG/ML (ref 0–4)
SODIUM SERPL-SCNC: 140 MMOL/L (ref 136–145)
TRIGL SERPL-MCNC: 58 MG/DL (ref 0–150)
VLDLC SERPL-MCNC: 10 MG/DL (ref 5–40)

## 2024-01-19 PROCEDURE — 80061 LIPID PANEL: CPT

## 2024-01-19 PROCEDURE — 36415 COLL VENOUS BLD VENIPUNCTURE: CPT

## 2024-01-19 PROCEDURE — G0103 PSA SCREENING: HCPCS

## 2024-01-19 PROCEDURE — 80053 COMPREHEN METABOLIC PANEL: CPT

## 2024-01-23 RX ORDER — ATORVASTATIN CALCIUM 40 MG/1
20 TABLET, FILM COATED ORAL DAILY
Qty: 45 TABLET | Refills: 1 | Status: SHIPPED | OUTPATIENT
Start: 2024-01-23

## 2024-01-23 RX ORDER — LOSARTAN POTASSIUM 25 MG/1
25 TABLET ORAL DAILY
Qty: 90 TABLET | Refills: 0 | Status: SHIPPED | OUTPATIENT
Start: 2024-01-23

## 2024-01-23 RX ORDER — AMLODIPINE BESYLATE 10 MG/1
10 TABLET ORAL DAILY
Qty: 90 TABLET | Refills: 1 | Status: SHIPPED | OUTPATIENT
Start: 2024-01-23

## 2024-05-07 RX ORDER — LOSARTAN POTASSIUM 25 MG/1
25 TABLET ORAL DAILY
Qty: 90 TABLET | Refills: 0 | Status: SHIPPED | OUTPATIENT
Start: 2024-05-07

## 2024-05-30 RX ORDER — ATORVASTATIN CALCIUM 40 MG/1
20 TABLET, FILM COATED ORAL DAILY
Qty: 45 TABLET | Refills: 0 | Status: SHIPPED | OUTPATIENT
Start: 2024-05-30

## 2024-07-05 ENCOUNTER — OFFICE VISIT (OUTPATIENT)
Dept: FAMILY MEDICINE CLINIC | Facility: CLINIC | Age: 64
End: 2024-07-05
Payer: COMMERCIAL

## 2024-07-05 VITALS
TEMPERATURE: 97.9 F | WEIGHT: 181.8 LBS | BODY MASS INDEX: 23.33 KG/M2 | DIASTOLIC BLOOD PRESSURE: 77 MMHG | RESPIRATION RATE: 17 BRPM | SYSTOLIC BLOOD PRESSURE: 116 MMHG | HEIGHT: 74 IN | OXYGEN SATURATION: 98 % | HEART RATE: 55 BPM

## 2024-07-05 DIAGNOSIS — Z72.0 TOBACCO USE: Chronic | ICD-10-CM

## 2024-07-05 DIAGNOSIS — E78.2 MIXED HYPERLIPIDEMIA: Chronic | ICD-10-CM

## 2024-07-05 DIAGNOSIS — I10 PRIMARY HYPERTENSION: Chronic | ICD-10-CM

## 2024-07-05 DIAGNOSIS — F10.10 ALCOHOL ABUSE: Chronic | ICD-10-CM

## 2024-07-05 PROCEDURE — 99214 OFFICE O/P EST MOD 30 MIN: CPT | Performed by: NURSE PRACTITIONER

## 2024-07-05 RX ORDER — ATORVASTATIN CALCIUM 40 MG/1
20 TABLET, FILM COATED ORAL DAILY
Qty: 45 TABLET | Refills: 0 | Status: SHIPPED | OUTPATIENT
Start: 2024-07-05

## 2024-07-05 RX ORDER — AMLODIPINE BESYLATE 10 MG/1
10 TABLET ORAL DAILY
Qty: 90 TABLET | Refills: 1 | Status: SHIPPED | OUTPATIENT
Start: 2024-07-05

## 2024-07-05 RX ORDER — LOSARTAN POTASSIUM 25 MG/1
25 TABLET ORAL DAILY
Qty: 90 TABLET | Refills: 0 | Status: SHIPPED | OUTPATIENT
Start: 2024-07-05

## 2024-07-05 NOTE — PATIENT INSTRUCTIONS
Continue current medications.  Eat more fruit and vegetables  Limit alcohol use  Stop using tobacco. Develop a plan

## 2024-07-05 NOTE — PROGRESS NOTES
Subjective        Enrrique Cason is a 63 y.o. male.     Chief Complaint   Patient presents with    Hypertension       Hypertension      Patient is here for management of his chronic medical problems:   Hypertension, hyperlipidemia, alcohol use.     Hypertension: taking losartan 25 mg daily amlodipine 10 mg daily.    Hyperlipidemia atorvastatin 40 mg  takes 1/2 daily.. Has hypertension. Labs 1/19/2024  high hdl 92 high ldl 106 high. He does drink alcohol.     Tobacco use : he chews tobacco.     Alcohol use daily. He is not drinking as much vodka more beer due to heat.         The following portions of the patient's history were reviewed and updated as appropriate: allergies, current medications, past family history, past medical history, past social history, past surgical history and problem list.      Current Outpatient Medications:     amLODIPine (NORVASC) 10 MG tablet, Take 1 tablet by mouth Daily., Disp: 90 tablet, Rfl: 1    atorvastatin (LIPITOR) 40 MG tablet, Take 0.5 tablets by mouth Daily., Disp: 45 tablet, Rfl: 0    losartan (COZAAR) 25 MG tablet, Take 1 tablet by mouth Daily., Disp: 90 tablet, Rfl: 0    Recent Results (from the past 4032 hour(s))   Lipid Panel    Collection Time: 01/19/24 10:27 AM    Specimen: Blood   Result Value Ref Range    Total Cholesterol 208 (H) 0 - 200 mg/dL    Triglycerides 58 0 - 150 mg/dL    HDL Cholesterol 92 (H) 40 - 60 mg/dL    LDL Cholesterol  106 (H) 0 - 100 mg/dL    VLDL Cholesterol 10 5 - 40 mg/dL    LDL/HDL Ratio 1.13    Comprehensive Metabolic Panel    Collection Time: 01/19/24 10:27 AM    Specimen: Blood   Result Value Ref Range    Glucose 114 (H) 65 - 99 mg/dL    BUN 8 8 - 23 mg/dL    Creatinine 0.77 0.76 - 1.27 mg/dL    Sodium 140 136 - 145 mmol/L    Potassium 4.3 3.5 - 5.2 mmol/L    Chloride 103 98 - 107 mmol/L    CO2 26.0 22.0 - 29.0 mmol/L    Calcium 10.1 8.6 - 10.5 mg/dL    Total Protein 7.9 6.0 - 8.5 g/dL    Albumin 4.9 3.5 - 5.2 g/dL    ALT (SGPT) 18 1 -  "41 U/L    AST (SGOT) 20 1 - 40 U/L    Alkaline Phosphatase 113 39 - 117 U/L    Total Bilirubin 0.5 0.0 - 1.2 mg/dL    Globulin 3.0 gm/dL    A/G Ratio 1.6 g/dL    BUN/Creatinine Ratio 10.4 7.0 - 25.0    Anion Gap 11.0 5.0 - 15.0 mmol/L    eGFR 100.6 >60.0 mL/min/1.73   PSA Screen    Collection Time: 01/19/24 10:27 AM    Specimen: Blood   Result Value Ref Range    PSA 0.551 0.000 - 4.000 ng/mL         Review of Systems   HENT:          He had full dental extraction. Does not wear dentures   Eyes: Negative.         He is wearing cheaters no recent eye exam   Cardiovascular: Negative.    Musculoskeletal: Negative.    Allergic/Immunologic: Negative.    Psychiatric/Behavioral:  Negative for decreased concentration and suicidal ideas. The patient is not nervous/anxious.        Objective     /77 (BP Location: Left arm, Patient Position: Sitting, Cuff Size: Adult)   Pulse 55   Temp 97.9 °F (36.6 °C) (Oral)   Resp 17   Ht 188 cm (74\")   Wt 82.5 kg (181 lb 12.8 oz)   SpO2 98%   BMI 23.34 kg/m²     Physical Exam  Vitals and nursing note reviewed.   Constitutional:       Appearance: Normal appearance.   HENT:      Head: Normocephalic.      Right Ear: Tympanic membrane normal.      Left Ear: Tympanic membrane normal.      Nose: Nose normal.      Mouth/Throat:      Mouth: Mucous membranes are moist.   Eyes:      Conjunctiva/sclera: Conjunctivae normal.   Cardiovascular:      Rate and Rhythm: Normal rate and regular rhythm.      Pulses: Normal pulses.      Heart sounds: Normal heart sounds.   Pulmonary:      Breath sounds: Normal breath sounds.   Abdominal:      General: Bowel sounds are normal.      Palpations: Abdomen is soft.   Musculoskeletal:         General: Normal range of motion.      Cervical back: Neck supple.   Skin:     General: Skin is warm.      Capillary Refill: Capillary refill takes less than 2 seconds.   Neurological:      General: No focal deficit present.      Mental Status: He is alert and oriented " to person, place, and time.   Psychiatric:         Mood and Affect: Mood normal.         Behavior: Behavior normal.         Result Review :                Assessment & Plan    Diagnoses and all orders for this visit:    1. Alcohol abuse  Comments:  he continues work daily on his job and does drink beer or vodka \daily.    2. Mixed hyperlipidemia  Comments:  stable    3. Primary hypertension  Comments:  stable on current medications    4. Tobacco use  Comments:  discussed cessation he is not ready.    Other orders  -     losartan (COZAAR) 25 MG tablet; Take 1 tablet by mouth Daily.  Dispense: 90 tablet; Refill: 0  -     atorvastatin (LIPITOR) 40 MG tablet; Take 0.5 tablets by mouth Daily.  Dispense: 45 tablet; Refill: 0  -     amLODIPine (NORVASC) 10 MG tablet; Take 1 tablet by mouth Daily.  Dispense: 90 tablet; Refill: 1      Patient Instructions   Continue current medications.  Eat more fruit and vegetables  Limit alcohol use  Stop using tobacco. Develop a plan    Follow Up   No follow-ups on file.    Patient was given instructions and counseling regarding his condition or for health maintenance advice. Please see specific information pulled into the AVS if appropriate.     Joanna Hair, APRN    07/05/24

## 2024-10-10 RX ORDER — LOSARTAN POTASSIUM 25 MG/1
25 TABLET ORAL DAILY
Qty: 90 TABLET | Refills: 0 | Status: SHIPPED | OUTPATIENT
Start: 2024-10-10

## 2024-10-10 RX ORDER — ATORVASTATIN CALCIUM 40 MG/1
20 TABLET, FILM COATED ORAL DAILY
Qty: 45 TABLET | Refills: 0 | Status: SHIPPED | OUTPATIENT
Start: 2024-10-10

## 2024-10-27 RX ORDER — LOSARTAN POTASSIUM 25 MG/1
25 TABLET ORAL DAILY
Qty: 90 TABLET | Refills: 0 | Status: SHIPPED | OUTPATIENT
Start: 2024-10-27

## 2024-12-27 RX ORDER — ATORVASTATIN CALCIUM 40 MG/1
20 TABLET, FILM COATED ORAL DAILY
Qty: 45 TABLET | Refills: 0 | Status: SHIPPED | OUTPATIENT
Start: 2024-12-27

## 2025-01-10 ENCOUNTER — OFFICE VISIT (OUTPATIENT)
Dept: FAMILY MEDICINE CLINIC | Facility: CLINIC | Age: 65
End: 2025-01-10
Payer: COMMERCIAL

## 2025-01-10 VITALS
RESPIRATION RATE: 18 BRPM | DIASTOLIC BLOOD PRESSURE: 86 MMHG | HEART RATE: 61 BPM | WEIGHT: 186.4 LBS | BODY MASS INDEX: 23.92 KG/M2 | TEMPERATURE: 98.6 F | SYSTOLIC BLOOD PRESSURE: 144 MMHG | OXYGEN SATURATION: 97 % | HEIGHT: 74 IN

## 2025-01-10 DIAGNOSIS — Z12.5 PROSTATE CANCER SCREENING: Chronic | ICD-10-CM

## 2025-01-10 DIAGNOSIS — F10.10 ALCOHOL ABUSE: Chronic | ICD-10-CM

## 2025-01-10 DIAGNOSIS — I10 PRIMARY HYPERTENSION: Primary | Chronic | ICD-10-CM

## 2025-01-10 DIAGNOSIS — E78.2 MIXED HYPERLIPIDEMIA: Chronic | ICD-10-CM

## 2025-01-10 DIAGNOSIS — Z72.0 TOBACCO USE: Chronic | ICD-10-CM

## 2025-01-10 PROCEDURE — 99214 OFFICE O/P EST MOD 30 MIN: CPT | Performed by: NURSE PRACTITIONER

## 2025-01-10 NOTE — PATIENT INSTRUCTIONS
Follow up on lab work. Dont complete until 1/20/2025  You need a TDAP. Maybe get it when you get labs drawn.

## 2025-01-10 NOTE — PROGRESS NOTES
"Subjective        Enrrique Cason is a 64 y.o. male.     Chief Complaint   Patient presents with    Hyperlipidemia     6 month fl/u       Hyperlipidemia      Patient is here for management of his chronic medical problems:   Hypertension, hyperlipidemia, tobacco use, alcohol use.     Hypertension: taking amlodipine 10 mg daily losartan 25 mg daily. Has hyperlipidemia, he does drink alcohol daily.     Hyperlipidemia taking atorvastatin 40 mg daily. Has hypertension.     Tobacco use  he chews but does not smoke.     Alcohol use he drinks daily but works  full time job. He has several jobs.       The following portions of the patient's history were reviewed and updated as appropriate: allergies, current medications, past family history, past medical history, past social history, past surgical history and problem list.      Current Outpatient Medications:     amLODIPine (NORVASC) 10 MG tablet, Take 1 tablet by mouth Daily., Disp: 90 tablet, Rfl: 1    atorvastatin (LIPITOR) 40 MG tablet, Take 1/2 (one-half) tablet by mouth once daily, Disp: 45 tablet, Rfl: 0    losartan (COZAAR) 25 MG tablet, Take 1 tablet by mouth once daily, Disp: 90 tablet, Rfl: 0    No results found for this or any previous visit (from the past 24 weeks).      Review of Systems    Objective     /86   Pulse 61   Temp 98.6 °F (37 °C) (Oral)   Resp 18   Ht 188 cm (74.02\")   Wt 84.6 kg (186 lb 6.4 oz)   SpO2 97%   BMI 23.92 kg/m²     Physical Exam  Vitals and nursing note reviewed.   Constitutional:       Appearance: Normal appearance.   HENT:      Head: Normocephalic.      Right Ear: Tympanic membrane normal.      Left Ear: Tympanic membrane normal.      Nose: Nose normal.      Mouth/Throat:      Mouth: Mucous membranes are moist.   Eyes:      Pupils: Pupils are equal, round, and reactive to light.   Cardiovascular:      Rate and Rhythm: Normal rate and regular rhythm.      Pulses: Normal pulses.      Heart sounds: Normal heart sounds. "   Pulmonary:      Effort: Pulmonary effort is normal.      Breath sounds: Normal breath sounds.   Abdominal:      General: Bowel sounds are normal.      Palpations: Abdomen is soft.   Musculoskeletal:         General: Normal range of motion.   Skin:     General: Skin is warm.   Neurological:      General: No focal deficit present.      Mental Status: He is alert and oriented to person, place, and time.   Psychiatric:         Mood and Affect: Mood normal.         Behavior: Behavior normal.         Thought Content: Thought content normal.         Judgment: Judgment normal.         Result Review :                Assessment & Plan    Diagnoses and all orders for this visit:    1. Primary hypertension (Primary)  Comments:  stable  Orders:  -     Lipid Panel; Future  -     Comprehensive Metabolic Panel; Future    2. Mixed hyperlipidemia  Comments:  labs ordered  Orders:  -     Lipid Panel; Future  -     Comprehensive Metabolic Panel; Future    3. Alcohol abuse  Comments:  he is not ready to quit.    4. Tobacco use  Comments:  he chews not going to quit.    5. Prostate cancer screening  Comments:  labs ordered  Orders:  -     PSA Screen; Future      Patient Instructions   Follow up on lab work. Dont complete until 1/20/2025  You need a TDAP. Maybe get it when you get labs drawn.         Follow Up   No follow-ups on file.    Patient was given instructions and counseling regarding his condition or for health maintenance advice. Please see specific information pulled into the AVS if appropriate.     Joanna Hair, APRN    01/10/25

## 2025-01-23 ENCOUNTER — LAB (OUTPATIENT)
Dept: LAB | Facility: HOSPITAL | Age: 65
End: 2025-01-23
Payer: COMMERCIAL

## 2025-01-23 DIAGNOSIS — Z12.5 PROSTATE CANCER SCREENING: Chronic | ICD-10-CM

## 2025-01-23 DIAGNOSIS — E78.2 MIXED HYPERLIPIDEMIA: Chronic | ICD-10-CM

## 2025-01-23 DIAGNOSIS — I10 PRIMARY HYPERTENSION: Chronic | ICD-10-CM

## 2025-01-23 LAB
ALBUMIN SERPL-MCNC: 4.6 G/DL (ref 3.5–5.2)
ALBUMIN/GLOB SERPL: 1.4 G/DL
ALP SERPL-CCNC: 145 U/L (ref 39–117)
ALT SERPL W P-5'-P-CCNC: 20 U/L (ref 1–41)
ANION GAP SERPL CALCULATED.3IONS-SCNC: 11 MMOL/L (ref 5–15)
AST SERPL-CCNC: 18 U/L (ref 1–40)
BILIRUB SERPL-MCNC: 0.3 MG/DL (ref 0–1.2)
BUN SERPL-MCNC: 18 MG/DL (ref 8–23)
BUN/CREAT SERPL: 18.8 (ref 7–25)
CALCIUM SPEC-SCNC: 9.6 MG/DL (ref 8.6–10.5)
CHLORIDE SERPL-SCNC: 105 MMOL/L (ref 98–107)
CHOLEST SERPL-MCNC: 226 MG/DL (ref 0–200)
CO2 SERPL-SCNC: 27 MMOL/L (ref 22–29)
CREAT SERPL-MCNC: 0.96 MG/DL (ref 0.76–1.27)
EGFRCR SERPLBLD CKD-EPI 2021: 88.3 ML/MIN/1.73
GLOBULIN UR ELPH-MCNC: 3.2 GM/DL
GLUCOSE SERPL-MCNC: 88 MG/DL (ref 65–99)
HDLC SERPL-MCNC: 79 MG/DL (ref 40–60)
LDLC SERPL CALC-MCNC: 120 MG/DL (ref 0–100)
LDLC/HDLC SERPL: 1.46 {RATIO}
POTASSIUM SERPL-SCNC: 4.4 MMOL/L (ref 3.5–5.2)
PROT SERPL-MCNC: 7.8 G/DL (ref 6–8.5)
PSA SERPL-MCNC: 0.46 NG/ML (ref 0–4)
SODIUM SERPL-SCNC: 143 MMOL/L (ref 136–145)
TRIGL SERPL-MCNC: 159 MG/DL (ref 0–150)
VLDLC SERPL-MCNC: 27 MG/DL (ref 5–40)

## 2025-01-23 PROCEDURE — 36415 COLL VENOUS BLD VENIPUNCTURE: CPT

## 2025-01-23 PROCEDURE — 80053 COMPREHEN METABOLIC PANEL: CPT

## 2025-01-23 PROCEDURE — 80061 LIPID PANEL: CPT

## 2025-01-23 PROCEDURE — G0103 PSA SCREENING: HCPCS

## 2025-01-24 RX ORDER — AMLODIPINE BESYLATE 10 MG/1
10 TABLET ORAL DAILY
Qty: 90 TABLET | Refills: 0 | Status: SHIPPED | OUTPATIENT
Start: 2025-01-24

## 2025-01-24 RX ORDER — ATORVASTATIN CALCIUM 80 MG/1
80 TABLET, FILM COATED ORAL DAILY
Qty: 90 TABLET | Refills: 1 | Status: SHIPPED | OUTPATIENT
Start: 2025-01-24

## 2025-02-14 ENCOUNTER — TELEPHONE (OUTPATIENT)
Dept: FAMILY MEDICINE CLINIC | Facility: CLINIC | Age: 65
End: 2025-02-14
Payer: COMMERCIAL

## 2025-02-14 NOTE — TELEPHONE ENCOUNTER
Caller: Enrrique Cason    Relationship: Self    Best call back number:     KamlaEnrrique (Self) [phone] (Home)         What was the call regarding: PATIENT IS WANTING A CALLBACK FROM THE OFFICE REGARDING HIS ATORVASTATIN     HE WASN'T AWARE THAT THE DOSE HAD INCREASED AND WANTED TO MAKE SURE HE'S TAKING THE RIGHT STRENGTH     Is it okay if the provider responds through MyChart: PLEASE CALL AND ADVISE

## 2025-02-17 NOTE — TELEPHONE ENCOUNTER
Name: KamlaEnrrique      Relationship: Self      Best Callback Number:     HUB PROVIDED THE RELAY MESSAGE FROM THE OFFICE      PATIENT: VOICED UNDERSTANDING AND HAS NO FURTHER QUESTIONS AT THIS TIME    ADDITIONAL INFORMATION:

## 2025-04-21 RX ORDER — LOSARTAN POTASSIUM 25 MG/1
25 TABLET ORAL DAILY
Qty: 90 TABLET | Refills: 0 | Status: SHIPPED | OUTPATIENT
Start: 2025-04-21

## 2025-04-26 RX ORDER — AMLODIPINE BESYLATE 10 MG/1
10 TABLET ORAL DAILY
Qty: 90 TABLET | Refills: 0 | Status: SHIPPED | OUTPATIENT
Start: 2025-04-26

## 2025-07-11 ENCOUNTER — PATIENT ROUNDING (BHMG ONLY) (OUTPATIENT)
Dept: FAMILY MEDICINE CLINIC | Facility: CLINIC | Age: 65
End: 2025-07-11
Payer: COMMERCIAL

## 2025-07-11 ENCOUNTER — OFFICE VISIT (OUTPATIENT)
Dept: FAMILY MEDICINE CLINIC | Facility: CLINIC | Age: 65
End: 2025-07-11
Payer: COMMERCIAL

## 2025-07-11 VITALS
OXYGEN SATURATION: 97 % | WEIGHT: 182 LBS | HEIGHT: 74 IN | HEART RATE: 75 BPM | TEMPERATURE: 98.5 F | DIASTOLIC BLOOD PRESSURE: 68 MMHG | RESPIRATION RATE: 16 BRPM | BODY MASS INDEX: 23.36 KG/M2 | SYSTOLIC BLOOD PRESSURE: 132 MMHG

## 2025-07-11 DIAGNOSIS — Z72.0 TOBACCO USE: Chronic | ICD-10-CM

## 2025-07-11 DIAGNOSIS — F10.10 ALCOHOL ABUSE: Chronic | ICD-10-CM

## 2025-07-11 DIAGNOSIS — E78.2 MIXED HYPERLIPIDEMIA: Chronic | ICD-10-CM

## 2025-07-11 DIAGNOSIS — I10 PRIMARY HYPERTENSION: Chronic | ICD-10-CM

## 2025-07-11 NOTE — PROGRESS NOTES
July 11, 2025    Hello, may I speak with Enrrique Cason?    My name is Jovanna      I am  with [facility]  [address] [place] [address].    Before we get started may I verify your date of birth? 1960    I am calling to officially welcome you to our practice and ask about your recent visit. Is this a good time to talk? yes    Tell me about your visit with us. What things went well?  My visit was good. I really enjoy coming here.       We're always looking for ways to make our patients' experiences even better. Do you have recommendations on ways we may improve?  no    Overall were you satisfied with your first visit to our practice? yes       I appreciate you taking the time to speak with me today. Is there anything else I can do for you? no      Thank you, and have a great day.

## 2025-07-11 NOTE — PROGRESS NOTES
"Subjective        Enrrique Cason is a 64 y.o. male.     Chief Complaint   Patient presents with    Hypertension     6 month fl up    Hyperlipidemia       Hypertension  Hyperlipidemia      Patient is here for management of his chronic medical problems: hypertension, hyperlipidemia.     Hypertension: taking amlodipine 10 mg daily losartan 25 mg daily.     Hyperlipidemia atorvastatin 80 mg daily has hypertension.     Alcohol use: he is drinking much less. He just retired.   He is trying to work in his yard more.     Tobacco use he chews tobacco.       The following portions of the patient's history were reviewed and updated as appropriate: allergies, current medications, past family history, past medical history, past social history, past surgical history and problem list.      Current Outpatient Medications:     amLODIPine (NORVASC) 10 MG tablet, Take 1 tablet by mouth once daily, Disp: 90 tablet, Rfl: 0    atorvastatin (LIPITOR) 80 MG tablet, Take 1 tablet by mouth Daily., Disp: 90 tablet, Rfl: 1    losartan (COZAAR) 25 MG tablet, Take 1 tablet by mouth once daily, Disp: 90 tablet, Rfl: 0    No results found for this or any previous visit (from the past 24 weeks).      Review of Systems    Objective     /68   Pulse 75   Temp 98.5 °F (36.9 °C) (Oral)   Resp 16   Ht 188 cm (74.02\")   Wt 82.6 kg (182 lb)   SpO2 97%   BMI 23.36 kg/m²     Physical Exam  Vitals and nursing note reviewed.   Constitutional:       Appearance: Normal appearance.   HENT:      Head: Normocephalic and atraumatic.      Right Ear: Tympanic membrane, ear canal and external ear normal.      Left Ear: Tympanic membrane, ear canal and external ear normal.      Nose: Nose normal.      Mouth/Throat:      Mouth: Mucous membranes are moist.   Eyes:      Conjunctiva/sclera: Conjunctivae normal.      Pupils: Pupils are equal, round, and reactive to light.   Cardiovascular:      Rate and Rhythm: Normal rate and regular rhythm.      Pulses: Normal " pulses.      Heart sounds: Normal heart sounds.   Pulmonary:      Effort: Pulmonary effort is normal.      Breath sounds: Normal breath sounds.   Abdominal:      General: Bowel sounds are normal.      Palpations: Abdomen is soft.   Musculoskeletal:         General: Normal range of motion.   Skin:     General: Skin is warm.   Neurological:      General: No focal deficit present.      Mental Status: He is alert and oriented to person, place, and time.   Psychiatric:         Mood and Affect: Mood normal.         Behavior: Behavior normal.         Thought Content: Thought content normal.         Judgment: Judgment normal.         Result Review :                Assessment & Plan    Diagnoses and all orders for this visit:    1. Tobacco use  Comments:  discussed cessation    2. Mixed hyperlipidemia  Comments:  stable on current medications.    3. Primary hypertension  Comments:  stable on current medications    4. Alcohol abuse  Comments:  he is working on drinking less. he has plan      Patient Instructions   Limit alcohol to one drink a day.   Eat healthy  Exercise daily.      Follow Up   Return in about 6 months (around 1/11/2026).    Patient was given instructions and counseling regarding his condition or for health maintenance advice. Please see specific information pulled into the AVS if appropriate.     Joanna Hair, APRN    07/11/25

## 2025-07-17 RX ORDER — LOSARTAN POTASSIUM 25 MG/1
25 TABLET ORAL DAILY
Qty: 90 TABLET | Refills: 0 | Status: SHIPPED | OUTPATIENT
Start: 2025-07-17

## 2025-07-28 RX ORDER — AMLODIPINE BESYLATE 10 MG/1
10 TABLET ORAL DAILY
Qty: 90 TABLET | Refills: 0 | Status: SHIPPED | OUTPATIENT
Start: 2025-07-28

## 2025-08-26 RX ORDER — ATORVASTATIN CALCIUM 80 MG/1
80 TABLET, FILM COATED ORAL DAILY
Qty: 90 TABLET | Refills: 0 | Status: SHIPPED | OUTPATIENT
Start: 2025-08-26